# Patient Record
Sex: FEMALE | Race: BLACK OR AFRICAN AMERICAN | Employment: OTHER | RURAL
[De-identification: names, ages, dates, MRNs, and addresses within clinical notes are randomized per-mention and may not be internally consistent; named-entity substitution may affect disease eponyms.]

---

## 2017-01-01 ENCOUNTER — OFFICE VISIT (OUTPATIENT)
Dept: FAMILY MEDICINE CLINIC | Age: 67
End: 2017-01-01

## 2017-01-01 ENCOUNTER — HOSPITAL ENCOUNTER (OUTPATIENT)
Dept: MAMMOGRAPHY | Age: 67
Discharge: HOME | End: 2017-11-02
Payer: MEDICARE

## 2017-01-01 VITALS
OXYGEN SATURATION: 97 % | HEART RATE: 78 BPM | TEMPERATURE: 97.1 F | WEIGHT: 121 LBS | DIASTOLIC BLOOD PRESSURE: 75 MMHG | BODY MASS INDEX: 19.44 KG/M2 | RESPIRATION RATE: 16 BRPM | HEIGHT: 66 IN | SYSTOLIC BLOOD PRESSURE: 162 MMHG

## 2017-01-01 DIAGNOSIS — N18.5 CKD (CHRONIC KIDNEY DISEASE), STAGE 5: ICD-10-CM

## 2017-01-01 DIAGNOSIS — E78.5 HYPERLIPIDEMIA, UNSPECIFIED HYPERLIPIDEMIA TYPE: ICD-10-CM

## 2017-01-01 DIAGNOSIS — I10 ESSENTIAL HYPERTENSION: Primary | ICD-10-CM

## 2017-01-01 DIAGNOSIS — Z12.39 SCREENING BREAST EXAMINATION: ICD-10-CM

## 2017-01-01 DIAGNOSIS — E11.9 DIABETES MELLITUS TYPE 2, DIET-CONTROLLED (HCC): ICD-10-CM

## 2017-01-01 LAB
CHOLEST SERPL-MCNC: 163 MG/DL (ref 100–199)
EST. AVERAGE GLUCOSE BLD GHB EST-MCNC: 131 MG/DL
HBA1C MFR BLD: 6.2 % (ref 4.8–5.6)
HDLC SERPL-MCNC: 74 MG/DL
LDLC SERPL CALC-MCNC: 77 MG/DL (ref 0–99)
TRIGL SERPL-MCNC: 58 MG/DL (ref 0–149)
VLDLC SERPL CALC-MCNC: 12 MG/DL (ref 5–40)

## 2017-01-01 PROCEDURE — 77067 SCR MAMMO BI INCL CAD: CPT

## 2017-01-01 RX ORDER — HYDRALAZINE HYDROCHLORIDE 25 MG/1
25 TABLET, FILM COATED ORAL 3 TIMES DAILY
COMMUNITY
Start: 2017-01-01

## 2017-01-01 RX ORDER — LISINOPRIL 40 MG/1
40 TABLET ORAL DAILY
COMMUNITY
Start: 2017-01-01

## 2017-01-01 RX ORDER — SEVELAMER CARBONATE 800 MG/1
1600 TABLET, FILM COATED ORAL
COMMUNITY

## 2017-02-08 ENCOUNTER — SURGERY (OUTPATIENT)
Age: 67
End: 2017-02-08

## 2017-02-08 ENCOUNTER — ANESTHESIA EVENT (OUTPATIENT)
Dept: ENDOSCOPY | Age: 67
End: 2017-02-08
Payer: MEDICARE

## 2017-02-08 ENCOUNTER — ANESTHESIA (OUTPATIENT)
Dept: ENDOSCOPY | Age: 67
End: 2017-02-08
Payer: MEDICARE

## 2017-02-08 ENCOUNTER — HOSPITAL ENCOUNTER (OUTPATIENT)
Age: 67
Setting detail: OUTPATIENT SURGERY
Discharge: HOME | End: 2017-02-08
Attending: INTERNAL MEDICINE | Admitting: INTERNAL MEDICINE
Payer: MEDICARE

## 2017-02-08 VITALS
BODY MASS INDEX: 18.8 KG/M2 | DIASTOLIC BLOOD PRESSURE: 78 MMHG | OXYGEN SATURATION: 100 % | TEMPERATURE: 97.5 F | SYSTOLIC BLOOD PRESSURE: 208 MMHG | HEART RATE: 68 BPM | RESPIRATION RATE: 13 BRPM | HEIGHT: 66 IN | WEIGHT: 117 LBS

## 2017-02-08 LAB
GLUCOSE BLD STRIP.AUTO-MCNC: 138 MG/DL (ref 65–100)
GLUCOSE BLD STRIP.AUTO-MCNC: 75 MG/DL (ref 65–100)
GLUCOSE BLD STRIP.AUTO-MCNC: 78 MG/DL (ref 65–100)
SERVICE CMNT-IMP: ABNORMAL
SERVICE CMNT-IMP: NORMAL
SERVICE CMNT-IMP: NORMAL

## 2017-02-08 PROCEDURE — 74011000250 HC RX REV CODE- 250: Performed by: ANESTHESIOLOGY

## 2017-02-08 PROCEDURE — 76040000019: Performed by: INTERNAL MEDICINE

## 2017-02-08 PROCEDURE — 77030020268 HC MISC GENERAL SUPPLY: Performed by: INTERNAL MEDICINE

## 2017-02-08 PROCEDURE — 74011250636 HC RX REV CODE- 250/636

## 2017-02-08 PROCEDURE — 88305 TISSUE EXAM BY PATHOLOGIST: CPT | Performed by: INTERNAL MEDICINE

## 2017-02-08 PROCEDURE — 82962 GLUCOSE BLOOD TEST: CPT

## 2017-02-08 PROCEDURE — 77030011640 HC PAD GRND REM COVD -A: Performed by: INTERNAL MEDICINE

## 2017-02-08 PROCEDURE — 77030013992 HC SNR POLYP ENDOSC BSC -B: Performed by: INTERNAL MEDICINE

## 2017-02-08 PROCEDURE — 74011250636 HC RX REV CODE- 250/636: Performed by: INTERNAL MEDICINE

## 2017-02-08 PROCEDURE — 76060000031 HC ANESTHESIA FIRST 0.5 HR: Performed by: INTERNAL MEDICINE

## 2017-02-08 PROCEDURE — 74011250637 HC RX REV CODE- 250/637: Performed by: INTERNAL MEDICINE

## 2017-02-08 RX ORDER — NALOXONE HYDROCHLORIDE 0.4 MG/ML
0.4 INJECTION, SOLUTION INTRAMUSCULAR; INTRAVENOUS; SUBCUTANEOUS
Status: DISCONTINUED | OUTPATIENT
Start: 2017-02-08 | End: 2017-02-08 | Stop reason: HOSPADM

## 2017-02-08 RX ORDER — PROPOFOL 10 MG/ML
INJECTION, EMULSION INTRAVENOUS
Status: DISCONTINUED | OUTPATIENT
Start: 2017-02-08 | End: 2017-02-08 | Stop reason: HOSPADM

## 2017-02-08 RX ORDER — DEXTROSE 50 % IN WATER (D50W) INTRAVENOUS SYRINGE
25 AS NEEDED
Status: DISCONTINUED | OUTPATIENT
Start: 2017-02-08 | End: 2017-02-08 | Stop reason: HOSPADM

## 2017-02-08 RX ORDER — ATROPINE SULFATE 0.1 MG/ML
0.5 INJECTION INTRAVENOUS
Status: DISCONTINUED | OUTPATIENT
Start: 2017-02-08 | End: 2017-02-08 | Stop reason: HOSPADM

## 2017-02-08 RX ORDER — MIDAZOLAM HYDROCHLORIDE 1 MG/ML
.25-5 INJECTION, SOLUTION INTRAMUSCULAR; INTRAVENOUS
Status: DISCONTINUED | OUTPATIENT
Start: 2017-02-08 | End: 2017-02-08 | Stop reason: HOSPADM

## 2017-02-08 RX ORDER — EPINEPHRINE 0.1 MG/ML
1 INJECTION INTRACARDIAC; INTRAVENOUS
Status: DISCONTINUED | OUTPATIENT
Start: 2017-02-08 | End: 2017-02-08 | Stop reason: HOSPADM

## 2017-02-08 RX ORDER — DEXTROMETHORPHAN/PSEUDOEPHED 2.5-7.5/.8
1.2 DROPS ORAL
Status: DISCONTINUED | OUTPATIENT
Start: 2017-02-08 | End: 2017-02-08 | Stop reason: HOSPADM

## 2017-02-08 RX ORDER — PROPOFOL 10 MG/ML
INJECTION, EMULSION INTRAVENOUS AS NEEDED
Status: DISCONTINUED | OUTPATIENT
Start: 2017-02-08 | End: 2017-02-08 | Stop reason: HOSPADM

## 2017-02-08 RX ORDER — SODIUM CHLORIDE 9 MG/ML
50 INJECTION, SOLUTION INTRAVENOUS CONTINUOUS
Status: DISCONTINUED | OUTPATIENT
Start: 2017-02-08 | End: 2017-02-08 | Stop reason: HOSPADM

## 2017-02-08 RX ORDER — FLUMAZENIL 0.1 MG/ML
0.2 INJECTION INTRAVENOUS
Status: DISCONTINUED | OUTPATIENT
Start: 2017-02-08 | End: 2017-02-08 | Stop reason: HOSPADM

## 2017-02-08 RX ADMIN — DEXTROSE MONOHYDRATE 12.5 G: 25 INJECTION, SOLUTION INTRAVENOUS at 13:22

## 2017-02-08 RX ADMIN — PROPOFOL 40 MG: 10 INJECTION, EMULSION INTRAVENOUS at 14:30

## 2017-02-08 RX ADMIN — SIMETHICONE 80 MG: 20 SUSPENSION/ DROPS ORAL at 14:40

## 2017-02-08 RX ADMIN — SODIUM CHLORIDE 50 ML/HR: 900 INJECTION, SOLUTION INTRAVENOUS at 12:56

## 2017-02-08 RX ADMIN — PROPOFOL 100 MCG/KG/MIN: 10 INJECTION, EMULSION INTRAVENOUS at 14:30

## 2017-02-08 NOTE — IP AVS SNAPSHOT
[address] [place] [address] 
[phone] Patient: Evin Deleon MRN: [id number] LAT:9/2/4327 You are allergic to the following Allergen Reactions Peanut Swelling Recent Documentation Height Weight Breastfeeding? BMI OB Status Smoking Status 1.676 m 53.1 kg No 18.88 kg/m2 Hysterectomy Never Smoker Emergency Contacts Name Discharge Info Relation Home Work Mobile Darian Gambino  Other Relative [6] RebekahMaxwell  Friend [5] [phone] Xochilt Gambino  Other Relative [6] [phone] About your hospitalization You were admitted on:  February 8, 2017 You last received care in the:  [facility] ENDOSCOPY You were discharged on:  February 8, 2017 Unit phone number:  [phone] Why you were hospitalized Your primary diagnosis was:  Not on File Providers Seen During Your Hospitalizations Provider Role Specialty Primary office phone Alejandra Mesa MD Attending Provider Gastroenterology [phone] Your Primary Care Physician (PCP) Primary Care Physician Office Phone Office Fax Pb Lizama [phone] Follow-up Information None Your Appointments Tuesday February 14, 2017  8:20 AM EST  
ROUTINE CARE with Jonathan Hutchinson MD  
[address] [place] [address] [phone] Current Discharge Medication List  
  
CONTINUE these medications which have NOT CHANGED Dose & Instructions Dispensing Information Comments Morning Noon Evening Bedtime  
 amLODIPine 10 mg tablet Commonly known as:  Frantz Foster Your next dose is: Today, Tomorrow Other:  _________ Dose:  10 mg Take 1 Tab by mouth daily. Quantity:  90 Tab Refills:  3  
     
   
   
   
  
 calcitRIOL 0.5 mcg capsule Commonly known as:  ROCALTROL Your next dose is: Today, Tomorrow Other:  _________ Dose:  2 mcg Take 4 Caps by mouth daily. Quantity:  60 Cap Refills:  0  
     
   
   
   
  
 calcium acetate 667 mg Cap Commonly known as:  PHOSLO Your next dose is: Today, Tomorrow Other:  _________ Dose:  2 Cap Take 2 Caps by mouth three (3) times daily (with meals). Quantity:  60 Cap Refills:  0  
     
   
   
   
  
 carvedilol 25 mg tablet Commonly known as:  [place] Pretty Your next dose is: Today, Tomorrow Other:  _________ Dose:  25 mg Take 1 Tab by mouth two (2) times daily (with meals). Quantity:  60 Tab Refills:  0  
     
   
   
   
  
 furosemide 40 mg tablet Commonly known as:  LASIX Your next dose is: Today, Tomorrow Other:  _________ Take one tablet daily Quantity:  30 Tab Refills:  30  
     
   
   
   
  
 LIPITOR 20 mg tablet Generic drug:  atorvastatin Your next dose is: Today, Tomorrow Other:  _________ TAKE 1 BY MOUTH DAILY Quantity:  30 Tab Refills:  5  
     
   
   
   
  
 lisinopril 5 mg tablet Commonly known as:  Jesse Chris Your next dose is: Today, Tomorrow Other:  _________ Dose:  5 mg Take 1 Tab by mouth daily. Quantity:  60 Tab Refills:  0  
     
   
   
   
  
 OTHER(NON-FORMULARY) Your next dose is: Today, Tomorrow Other:  _________ Dose:  1 Each  
1 Each by Does Not Apply route daily. Prothesis for BKA of R leg Quantity:  1 Each Refills:  0 PROCRIT 10,000 unit/mL injection Generic drug:  epoetin mark Your next dose is: Today, Tomorrow Other:  _________  
   
   
 by SubCUTAneous route once. Refills:  0  
     
   
   
   
  
 sodium bicarbonate 325 mg tablet Your next dose is: Today, Tomorrow Other:  _________ Dose:  325 mg Take 1 Tab by mouth four (4) times daily. Quantity:  120 Tab Refills:  6 Discharge Instructions [address] [facility] [address] [place], [address] [facility] 
[phone] Alexidaksha Lovejoy [id number] 1950 COLON DISCHARGE INSTRUCTIONS DISCOMFORT: 
Redness at IV site- apply warm compress to area; if redness or soreness persist- contact your physician There may be a slight amount of blood passed from the rectum Gaseous discomfort- walking, belching will help relieve any discomfort You may not operate a vehicle for 12 hours You may not  engage in an occupation involving machinery or appliances for rest of today You may not  drink alcoholic beverages for at least 12 hours Avoid making any critical decisions for at least 24 hour DIET: 
 High fiber diet.  however -  remember your colon is empty and a heavy meal will produce gas. Avoid these foods:  vegetables, fried / greasy foods, carbonated drinks for today ACTIVITY: It is recommended that you spend the remainder of the day resting -  avoid any strenuous activity. CALL M.D. ANY SIGN OF: Increasing pain, nausea, vomiting Abdominal distension (swelling) New increased bleeding (oral or rectal) Fever (chills) Pain in chest area Bloody discharge from nose or mouth Shortness of breath You may resume your medications Post procedure diagnosis:  diverticulosis 
cecum and ascending colon polyps Follow-up Instructions: 
 
If a specimen was collected, you will receive a letter with the result by mail within two  weeks. Depending on the result this letter will specify your follow up colonoscopy date. Please call us for any questions or concerns Geraldo Wolfe [id number] 1950 DISCHARGE SUMMARY from Nurse The following personal items collected during your admission are returned to you: Dental Appliance:   
Vision: Visual Aid: None Hearing Aid:   
Jewelry:   
Clothing:   
Other Valuables:   
Valuables sent to safe:    
 
 
  
High-Fiber Diet: Care Instructions Your Care Instructions A high-fiber diet may help you relieve constipation and feel less bloated. Your doctor and dietitian will help you make a high-fiber eating plan based on your personal needs. The plan will include the things you like to eat. It will also make sure that you get 30 grams of fiber a day. Before you make changes to the way you eat, be sure to talk with your doctor or dietitian. Follow-up care is a key part of your treatment and safety. Be sure to make and go to all appointments, and call your doctor if you are having problems. It's also a good idea to know your test results and keep a list of the medicines you take. How can you care for yourself at home? · You can increase how much fiber you get if you eat more of certain foods. These foods include: ¨ Whole-grain breads and cereals. ¨ Fruits, such as pears, apples, and peaches. Eat the skins, peels, and seeds, if you can. ¨ Vegetables, such as broccoli, cabbage, spinach, carrots, asparagus, and squash. ¨ Starchy vegetables. These include potatoes with skins, kidney beans, and lima beans. · Take a fiber supplement every day if your doctor recommends it. Examples are Benefiber, Citrucel, FiberCon, and Metamucil. Ask your doctor how much to take. · Drink plenty of fluids, enough so that your urine is light yellow or clear like water. If you have kidney, heart, or liver disease and have to limit fluids, talk with your doctor before you increase the amount of fluids you drink. · Get some exercise every day. Exercise helps stool move through the colon. It also helps prevent constipation. · Keep a food diary. Try to notice and write down what foods cause gas, pain, or other symptoms. Then you can avoid these foods. Where can you learn more? Go to http://george-junior.info/. Enter O383 in the search box to learn more about \"High-Fiber Diet: Care Instructions. \" Current as of: July 26, 2016 Content Version: 11.1 © 5381-5007 Workspace, Incorporated. Care instructions adapted under license by [employer] (which disclaims liability or warranty for this information). If you have questions about a medical condition or this instruction, always ask your healthcare professional. [address] any warranty or liability for your use of this information. Discharge Orders None Introducing [facility]! [employer] introduces Fliplingo patient portal. Now you can access parts of your medical record, email your doctor's office, and request medication refills online. 1. In your internet browser, go to [email or web address] [email or web address] 2. Click on the First Time User? Click Here link in the Sign In box. You will see the New Member Sign Up page. 3. Enter your Fliplingo Access Code exactly as it appears below. You will not need to use this code after youve completed the sign-up process. If you do not sign up before the expiration date, you must request a new code. · Fliplingo Access Code: [email or web address] Expires: 5/9/2017 12:00 PM 
 
4. Enter the last four digits of your Social Security Number (xxxx) and Date of Birth (mm/dd/yyyy) as indicated and click Submit. You will be taken to the next sign-up page. 5. Create a Fliplingo ID. This will be your Fliplingo login ID and cannot be changed, so think of one that is secure and easy to remember. 6. Create a Fliplingo password. You can change your password at any time. 7. Enter your Password Reset Question and Answer. This can be used at a later time if you forget your password. 8. Enter your e-mail address. You will receive e-mail notification when new information is available in [address] 9. Click Sign Up. You can now view and download portions of your medical record. 10. Click the Download Summary menu link to download a portable copy of your medical information. If you have questions, please visit the Frequently Asked Questions section of the InfoLogix website. Remember, InfoLogix is NOT to be used for urgent needs. For medical emergencies, dial 911. Now available from your iPhone and Android! General Information Please provide this summary of care documentation to your next provider. Patient Signature:  ____________________________________________________________ Date:  ____________________________________________________________  
  
Dat Delgado Provider Signature:  ____________________________________________________________ Date:  ____________________________________________________________

## 2017-02-08 NOTE — ROUTINE PROCESS
Shaquille Burnette  1950  [id number]    Situation:  Verbal report received from: Lorenzo Miguel RN  Procedure: Procedure(s):  COLONOSCOPY    Background:    Preoperative diagnosis: SCREENING  Postoperative diagnosis: diverticulosis  cecum and ascending colon polyps    :  Dr. Aspen Tomas  Assistant(s): Endoscopy Technician-1: Jessica Ruiz  Endoscopy RN-1: Nyla Sheets RN    Specimens:   ID Type Source Tests Collected by Time Destination   1 : cecum and ascending colon polyps Preservative   Perlita Price MD 2/8/2017 1441 Pathology     H. Pylori  no    Assessment:  Intra-procedure medications     Anesthesia gave intra-procedure sedation and medications, see anesthesia flow sheet yes    Intravenous fluids: NS@ KVO     Vital signs stable     Abdominal assessment: round and soft     Recommendation:  Discharge patient per MD order. Family or Friend   Permission to share finding with family or friend yes. Endoscopy discharge instructions have been reviewed and given to patient and friend. The patient and friend verbalized understanding and acceptance of instructions.

## 2017-02-08 NOTE — DISCHARGE INSTRUCTIONS
[address]  [facility] , [address] [place]  [phone]                   Richelle Maynro  [id number]  1950    COLON DISCHARGE INSTRUCTIONS    DISCOMFORT:  Redness at IV site- apply warm compress to area; if redness or soreness persist- contact your physician  There may be a slight amount of blood passed from the rectum  Gaseous discomfort- walking, belching will help relieve any discomfort  You may not operate a vehicle for 12 hours  You may not  engage in an occupation involving machinery or appliances for rest of today  You may not  drink alcoholic beverages for at least 12 hours  Avoid making any critical decisions for at least 24 hour    DIET:   High fiber diet. - however -  remember your colon is empty and a heavy meal will produce gas. Avoid these foods:  vegetables, fried / greasy foods, carbonated drinks for today     ACTIVITY:  It is recommended that you spend the remainder of the day resting -  avoid any strenuous activity. CALL M.D. ANY SIGN OF:   Increasing pain, nausea, vomiting  Abdominal distension (swelling)  New increased bleeding (oral or rectal)  Fever (chills)  Pain in chest area  Bloody discharge from nose or mouth  Shortness of breath    You may resume your medications    Post procedure diagnosis:  diverticulosis  cecum and ascending colon polyps    Follow-up Instructions:    If a specimen was collected, you will receive a letter with the result by mail within two  weeks. Depending on the result this letter will specify your follow up colonoscopy date.       Please call us for any questions or concerns                     Richelle Maynor  [id number]  1950        DISCHARGE SUMMARY from Nurse    The following personal items collected during your admission are returned to you:   Dental Appliance:    Vision: Visual Aid: None  Hearing Aid:    Jewelry:    Clothing:    Other Valuables:    Valuables sent to safe:            High-Fiber Diet: Care Instructions  Your Care Instructions  A high-fiber diet may help you relieve constipation and feel less bloated. Your doctor and dietitian will help you make a high-fiber eating plan based on your personal needs. The plan will include the things you like to eat. It will also make sure that you get 30 grams of fiber a day. Before you make changes to the way you eat, be sure to talk with your doctor or dietitian. Follow-up care is a key part of your treatment and safety. Be sure to make and go to all appointments, and call your doctor if you are having problems. It's also a good idea to know your test results and keep a list of the medicines you take. How can you care for yourself at home? · You can increase how much fiber you get if you eat more of certain foods. These foods include:  ¨ Whole-grain breads and cereals. ¨ Fruits, such as pears, apples, and peaches. Eat the skins, peels, and seeds, if you can. ¨ Vegetables, such as broccoli, cabbage, spinach, carrots, asparagus, and squash. ¨ Starchy vegetables. These include potatoes with skins, kidney beans, and lima beans. · Take a fiber supplement every day if your doctor recommends it. Examples are Benefiber, Citrucel, FiberCon, and Metamucil. Ask your doctor how much to take. · Drink plenty of fluids, enough so that your urine is light yellow or clear like water. If you have kidney, heart, or liver disease and have to limit fluids, talk with your doctor before you increase the amount of fluids you drink. · Get some exercise every day. Exercise helps stool move through the colon. It also helps prevent constipation. · Keep a food diary. Try to notice and write down what foods cause gas, pain, or other symptoms. Then you can avoid these foods. Where can you learn more? Go to http://george-junior.info/. Enter Z324 in the search box to learn more about \"High-Fiber Diet: Care Instructions. \"  Current as of: July 26, 2016  Content Version: 11.1  © 9339-4108 Seek & Adore, Incorporated. Care instructions adapted under license by [employer] ([employer]) (which disclaims liability or warranty for this information). If you have questions about a medical condition or this instruction, always ask your healthcare professional. [address] any warranty or liability for your use of this information.

## 2017-02-08 NOTE — ANESTHESIA PREPROCEDURE EVALUATION
Anesthetic History   No history of anesthetic complications            Review of Systems / Medical History  Patient summary reviewed, nursing notes reviewed and pertinent labs reviewed    Pulmonary  Within defined limits                 Neuro/Psych       CVA       Cardiovascular    Hypertension      CHF        Exercise tolerance: >4 METS  Comments: carvedilol (COREG) 25 mg tablet   2/7/2017 at Unknown time    Will use IV PRN    2015 echo  SUMMARY:  Left ventricle: No obvious wall motion abnormalities identified in the  views obtained. Wall thickness was increased. Features were consistent  with a pseudonormal left ventricular filling pattern, with concomitant  abnormal relaxation and increased filling pressure (grade 2 diastolic  dysfunction).      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

## 2017-02-08 NOTE — PERIOP NOTES
BP elevated. States she is due to take her BP med soon, and will resume her medication schedule as ordered.

## 2017-02-08 NOTE — PROCEDURES
[address]  [address] [facility], [address] [facility]  [phone]               Colonoscopy Operative Report      Indications:    Screening colonoscopy     :  Aidee Carrero MD    Referring Provider: Angel Escalante MD    Sedation:  MAC anesthesia Propofol    Procedure Details:  After informed consent was obtained with all risks and benefits of procedure explained and preoperative exam completed, the patient was taken to the endoscopy suite and placed in the left lateral decubitus position. Upon sequential sedation as per above, a digital rectal exam was performed  And was normal.  The Olympus videocolonoscope  was inserted in the rectum and carefully advanced to the cecum, which was identified by the ileocecal valve and appendiceal orifice, terminal ileum. The quality of preparation was poor in right colon and cecum. The colonoscope was slowly withdrawn with careful evaluation between folds. Retroflexion in the rectum was performed and was normal.    Findings:   Rectum: Grade 1 internal hemorrhoid(s); Sigmoid:     - Diverticulosis  Descending Colon:     - Diverticulosis  Transverse Colon: 2  Sessile polyp(s), the largest 6 mm in size;  Ascending Colon: 4  Sessile polyp(s), the largest 10 mm in size;  Cecum: 2  Sessile polyp(s), the largest 9 mm in size; Terminal Ileum: normal    Interventions:  8 complete polypectomy were performed using hot snare   ( 18 W power current) on largest polyps and cold snare on smaller ones. All polyps were  retrieved  endoclip was placed for hemorrhage prophylaxis x2    Specimen Removed:  As above. A total of 8 sessile colonic polyps    Complications: None. EBL:  None. Recommendations:   -Await pathology.  -High fiber diet. -Repeat colonoscopy in 1 year  With 2 day prep ( call office to schedule)    Resume normal medication(s). Discharge Disposition:  Home in the company of a  when able to ambulate.     Rodriguez Espinal Pj Watters MD  2/8/2017  3:00 PM

## 2017-02-08 NOTE — H&P
[address]  [address] [facility], [address] [facility]  [phone]                 History and Physical     NAME: Billy French   :  1950   MRN:  [id number]     HPI:   Pt referred by PCP for screening colonoscopy. The patient is a 78year old female who presents for a screening colonscopy. The patient presents for a screening colonoscopy evaluation [facility] There has been no associated change in bowel habits ( or use of blood thinners), hematochezia, weight loss, diarrhea or abdominal pain. The frequency of bowel movements has been The stools are of normal consistency. Previous diagnostic tests have included colonoscopy. Note for \"Screening Colonoscopy\":    Pt is a 77 yo woman w/ a hx of DM II (no meds), CVA () and presents  for screening colon evaluation due to being on dialysis (3 days per week M/W/) x3 years. [place]as colon was done in [place] and per pt believes she had a nl exam.  PSHx of hysterectomy.  Denies use of blood thinners, weight loss, diarrhea, dysphagia, heartburn, abd pain, indigestion, bloody stools, peritoineal dialysis.  P reports being on waiting list for renal tx.     Past Surgical History   Procedure Laterality Date    Hx gracie and bso      Hx heent       cataract    Hx other surgical       AV FISTULA LEFT ARM     Past Medical History   Diagnosis Date    Anemia     CHF (congestive heart failure) (HCC)     Chronic kidney disease     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 ([facility] [address]     CVA (cerebral infarction)      [facility] [address] [facility])     HTN (hypertension)     Hyperlipidemia     Hypoglycemia      [facility]    Renal failure     Stroke [facility])      Social History   Substance Use Topics    Smoking status: Never Smoker    Smokeless tobacco: Never Used    Alcohol use No      Comment: no caffeine, lives with sister, enjoys reading Allergies   Allergen Reactions    Peanut Swelling     History reviewed. No pertinent family history. Current Facility-Administered Medications   Medication Dose Route Frequency    0.9% sodium chloride infusion  50 mL/hr IntraVENous CONTINUOUS    midazolam (VERSED) injection 0.25-5 mg  0.25-5 mg IntraVENous Multiple    naloxone (NARCAN) injection 0.4 mg  0.4 mg IntraVENous Multiple    flumazenil (ROMAZICON) 0.1 mg/mL injection 0.2 mg  0.2 mg IntraVENous Multiple    simethicone (MYLICON) 79ZM/4.9YM oral drops 80 mg  1.2 mL Oral Multiple    atropine injection 0.5 mg  0.5 mg IntraVENous ONCE PRN    EPINEPHrine (ADRENALIN) 0.1 mg/mL syringe 1 mg  1 mg Endoscopically ONCE PRN    dextrose (D50W) injection syrg 12.5 g  25 mL IntraVENous PRN         PHYSICAL EXAM:  General: WD, WN. Alert, cooperative, no acute distress    HEENT: NC, Atraumatic. PERRLA, EOMI. Anicteric sclerae. Lungs:  CTA Bilaterally. No Wheezing/Rhonchi/Rales. Heart:  Regular  rhythm,  No murmur, No Rubs, No Gallops  Abdomen: Soft, Non distended, Non tender.  +Bowel sounds, no HSM  Extremities: No c/c/e  Neurologic:  CN 2-12 gi, Alert and oriented X 3. No acute neurological distress   Psych:   Good insight. Not anxious nor agitated. Assessment:   I have reviewed with the patient +/- family alternatives,benefits and risks for the procedure, as well as potential complications(with emphasis on, but not limited to, bleeding, perforation, cardiovascular/cerebrovascular/pulmonary events, reactions to the medications, infection, risk of missing a lesions/a cancer, and the imponderables including death), alternative options, and patient/family voices understanding.       Plan:   · Endoscopic procedure  · Conscious sedation or MAC

## 2017-02-08 NOTE — ANESTHESIA POSTPROCEDURE EVALUATION
Post-Anesthesia Evaluation and Assessment    Patient: Sara Calle MRN: [id number]  SSN: [id number]    YOB: 1950  Age: 78 y.o. Sex: female       Cardiovascular Function/Vital Signs  Visit Vitals    /87    Pulse 73    Temp 36.8 °C (98.3 °F)    Resp 12    Ht 5' 6\" (1.676 m)    Wt 53.1 kg (117 lb)    SpO2 100%    Breastfeeding No    BMI 18.88 kg/m2       Patient is status post MAC anesthesia for Procedure(s):  COLONOSCOPY  ENDOSCOPIC POLYPECTOMY  RESOLUTION CLIP. Nausea/Vomiting: None    Postoperative hydration reviewed and adequate. Pain:  Pain Scale 1: Numeric (0 - 10) (02/08/17 1247)  Pain Intensity 1: 0 (02/08/17 1247)   Managed    Neurological Status: At baseline    Mental Status and Level of Consciousness: Arousable    Pulmonary Status:   O2 Device: Nasal cannula (02/08/17 1455)   Adequate oxygenation and airway patent    Complications related to anesthesia: None    Post-anesthesia assessment completed.  No concerns    Signed By: Hazmah Guthrie MD     February 8, 2017

## 2017-02-08 NOTE — PERIOP NOTES
ABD remains soft and non-tender post procedure. Pt has no complaints at this time and tolerated the procedure well. Received report from anesthesia, see anesthesia record.

## 2017-02-14 ENCOUNTER — TELEPHONE (OUTPATIENT)
Dept: FAMILY MEDICINE CLINIC | Age: 67
End: 2017-02-14

## 2017-02-14 ENCOUNTER — OFFICE VISIT (OUTPATIENT)
Dept: FAMILY MEDICINE CLINIC | Age: 67
End: 2017-02-14

## 2017-02-14 VITALS
SYSTOLIC BLOOD PRESSURE: 179 MMHG | RESPIRATION RATE: 16 BRPM | TEMPERATURE: 97.8 F | OXYGEN SATURATION: 95 % | BODY MASS INDEX: 19.93 KG/M2 | DIASTOLIC BLOOD PRESSURE: 59 MMHG | WEIGHT: 124 LBS | HEART RATE: 87 BPM | HEIGHT: 66 IN

## 2017-02-14 DIAGNOSIS — S88.911A AMPUTATED RIGHT LEG (HCC): ICD-10-CM

## 2017-02-14 DIAGNOSIS — Z13.39 SCREENING FOR ALCOHOLISM: ICD-10-CM

## 2017-02-14 DIAGNOSIS — N18.6 ESRD ON DIALYSIS (HCC): ICD-10-CM

## 2017-02-14 DIAGNOSIS — Z00.00 ROUTINE GENERAL MEDICAL EXAMINATION AT A HEALTH CARE FACILITY: ICD-10-CM

## 2017-02-14 DIAGNOSIS — E11.9 DIABETES MELLITUS TYPE 2, DIET-CONTROLLED (HCC): ICD-10-CM

## 2017-02-14 DIAGNOSIS — Z13.31 SCREENING FOR DEPRESSION: ICD-10-CM

## 2017-02-14 DIAGNOSIS — I10 ESSENTIAL HYPERTENSION: Primary | ICD-10-CM

## 2017-02-14 DIAGNOSIS — Z99.2 ESRD ON DIALYSIS (HCC): ICD-10-CM

## 2017-02-14 NOTE — TELEPHONE ENCOUNTER
Patient needs a prescription written for:  Eval and Treat for BKA prosthetic leg    Faxed to [facility] [phone]

## 2017-02-14 NOTE — TELEPHONE ENCOUNTER
Patient called and requested that we contact   [facility] at [phone] about her request.  Patient was seen today.

## 2017-02-14 NOTE — PROGRESS NOTES
Reviewed record in preparation for visit and have necessary documentation  Pt did not bring medication to office visit for review  Opportunity was given for questions  Goals that were addressed and/or need to be completed after this appointment include   Health Maintenance Due   Topic Date Due    DTaP/Tdap/Td series (1 - Tdap) 05/03/1970    ZOSTER VACCINE AGE 60>  05/03/2009    OSTEOPOROSIS SCREENING (DEXA)  05/03/2014    INFLUENZA AGE 9 TO ADULT  08/01/2016    EYE EXAM RETINAL OR DILATED Q1  09/01/2016    MEDICARE YEARLY EXAM  11/17/2016     Will request eye exam.

## 2017-02-22 NOTE — PROGRESS NOTES
Chief Complaint   Patient presents with    Hypertension    Annual Wellness Visit     she is a 78y.o. year old female who presents for follow up of her HTN. BP uncontrolled at last OV. Medical history is significant for ESRD, HTN, HLD, DM2, anemia, GERD and vitamin D deficiency. BP monitored by nephrology at dialysis clinic. Patient denies HA, dizziness, SOB, CP, abdominal pain, dysuria, acute myalgias or arthralgias. She says she is in need of a new prosthetic leg. Hypertension:  The patient reports that she is taking medications as instructed, no medication side effects noted, no TIA's, no chest pain on exertion, no dyspnea on exertion, no swelling of ankles. Lifestyle modification/social history: sedentary     BP Readings from Last 3 Encounters:   02/14/17 179/59   02/08/17 (!) 208/78   11/03/16 160/66     Patient advised to log blood pressures at home 2-3 times weekly and bring to next visit. Call office as soon as possible if BP's over 140/90 on multiple occasions or with symptoms of dizziness, chest pain, shortness of breath, headache or ankle swelling. Our goal is to normalize the blood pressure to decrease the risks of strokes and heart attacks. The patient is in agreement with the plan.           Patient Active Problem List   Diagnosis Code    CHF (congestive heart failure) (HCC) I50.9    CVA (cerebral infarction) I63.9    Hyperlipidemia E78.5    HTN (hypertension) I10    Anemia, chronic disease D63.8    GERD (gastroesophageal reflux disease) K21.9    Vitamin D deficiency E55.9    Nausea R11.0    Constipation K59.00    Renal failure N19    Hyperkalemia E87.5    Renal failure (ARF), acute on chronic (HCC) N17.9, W62.5    Metabolic encephalopathy B58.83    Diastolic dysfunction W17.6    LVH (left ventricular hypertrophy) I51.7    UTI (lower urinary tract infection) N39.0    Type II diabetes mellitus with complication ([facility]) K94.6    Amputated right leg ([facility] [address] Z89.611     Past Surgical History:   Procedure Laterality Date    COLONOSCOPY  2/8/2017         COLONOSCOPY N/A 2/8/2017    COLONOSCOPY performed by Radha Aguilar MD at [address] [facility] HX HEENT      cataract    HX OTHER SURGICAL      AV FISTULA LEFT ARM    HX REJI AND BSO       Social History     Social History    Marital status: SINGLE     Spouse name: N/A    Number of children: N/A    Years of education: N/A     Occupational History    Not on file. Social History Main Topics    Smoking status: Never Smoker    Smokeless tobacco: Never Used    Alcohol use No      Comment: no caffeine, lives with sister, enjoys reading   [facility] Drug use: No    Sexual activity: Not on file     Other Topics Concern    Not on file     Social History Narrative     History reviewed. No pertinent family history. Current Outpatient Prescriptions   Medication Sig    LIPITOR 20 mg tablet TAKE 1 BY MOUTH DAILY    epoetin mark (PROCRIT) 10,000 unit/mL injection by SubCUTAneous route once.  furosemide (LASIX) 40 mg tablet Take one tablet daily    amLODIPine (NORVASC) 10 mg tablet Take 1 Tab by mouth daily.  lisinopril (PRINIVIL, ZESTRIL) 5 mg tablet Take 1 Tab by mouth daily.  carvedilol (COREG) 25 mg tablet Take 1 Tab by mouth two (2) times daily (with meals).  calcitRIOL (ROCALTROL) 0.5 mcg capsule Take 4 Caps by mouth daily.  calcium acetate (PHOSLO) 667 mg cap Take 2 Caps by mouth three (3) times daily (with meals). Yumiko Green, 1 Each by Does Not Apply route daily. Prothesis for BKA of R leg    sodium bicarbonate 325 mg tablet Take 1 Tab by mouth four (4) times daily. No current facility-administered medications for this visit.       Allergies   Allergen Reactions    Peanut Swelling       Review of Symptoms:  Constitutional: Negative for fever, chills, fatigue, malaise  Skin: Negative for rash or lesion  HEENT: Negative for acute hearing or vision changes  Resp: Negative for cough, wheezing or SOB  Cardiovascular: Negative for chest pain, dizziness or palpitations  Abdomen: Negative for nausea or abdominal pain  Genital/Urinary: ESRD on dialysis  Musculoskeltal: h/o R BKA, Negative for acute myalgias or arthralgias   Neurological: Negative for headache, weakness or paresthesia  Psychological: Negative for depression or anxiety     Vitals:    02/14/17 0815 02/14/17 0819   BP: 182/79 179/59   Pulse: 88 87   Resp: 16    Temp: 97.8 °F (36.6 °C)    TempSrc: Oral    SpO2: 95%    Weight: 124 lb (56.2 kg)    Height: 5' 6\" (1.676 m)        Physical Exam:  General: Cooperative, no distress, appears stated age. Skin: Skin color, texture, turgor normal.  Head: Normocephalic, atraumatic. Eyes: Conjunctivae clear. PERRL, EOMs intact. Neck: FROM, no adenopathy  Lungs: Clear to auscultation bilaterally with symmetrical effort. Heart: Regular rate and rhythm, S1, S2 normal, II/VI VAUGHN  Abdomen: Soft, non-tender. Non-distended. Bowel sounds normal.   Extremities: right BKA, LLE with no edema  Left Foot: decreased sensation, no lesion    Neurologic:  normal strength and sensation  Psychological: Alert and oriented. Affect appropriate    Neva Barajas was seen today for hypertension and annual wellness visit. Diagnoses and all orders for this visit:    Essential hypertension  -     REFERRAL TO OPHTHALMOLOGY    ESRD on dialysis ([facility] [address]    Diabetes mellitus type 2, diet-controlled ([facility] [address]  -     REFERRAL TO OPHTHALMOLOGY    Amputated right leg ([facility] [address]    Order for prosthetic leg evaluation and treatment made. I have discussed the diagnosis with the patient and the intended plan as seen in the above orders. Importance of compliance with all prescribed medications discussed. The patient expresses understanding and agreement with our plan of care. All of the patient's questions were answered to apparent satisfaction. The patient has received an after-visit summary.  The patient knows to call our office if there are any questions or concerns regarding diagnosis and treatment plans. I have discussed medication side effects and warnings with the patient as well. Follow-up Disposition:  Return in about 3 months (around 5/14/2017), or if symptoms worsen or fail to improve. The following Annual Medicare Wellness Exam is distinct and separate from the medical evaluation and decision making. This is a Subsequent Medicare Annual Wellness Visit providing Personalized Prevention Plan Services (PPPS) (Performed 12 months after initial AWV and PPPS )    I have reviewed the patient's medical history in detail and updated the computerized patient record. History     Past Medical History:   Diagnosis Date    Anemia     CHF (congestive heart failure) (HCC)     Chronic kidney disease     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 (HCC)     CVA (cerebral infarction)     September 2010 [facility] [address] [facility])     HTN (hypertension)     Hyperlipidemia     Hypoglycemia     [facility]    Renal failure     Stroke [facility])       Past Surgical History:   Procedure Laterality Date    COLONOSCOPY  2/8/2017         COLONOSCOPY N/A 2/8/2017    COLONOSCOPY performed by Devora Foster MD at [address] [facility] HX HEENT      cataract    HX OTHER SURGICAL      AV FISTULA LEFT ARM    HX REJI AND BSO       Current Outpatient Prescriptions   Medication Sig Dispense Refill    LIPITOR 20 mg tablet TAKE 1 BY MOUTH DAILY 30 Tab 5    epoetin mark (PROCRIT) 10,000 unit/mL injection by SubCUTAneous route once.  furosemide (LASIX) 40 mg tablet Take one tablet daily 30 Tab 30    amLODIPine (NORVASC) 10 mg tablet Take 1 Tab by mouth daily. 90 Tab 3    lisinopril (PRINIVIL, ZESTRIL) 5 mg tablet Take 1 Tab by mouth daily. 60 Tab 0    carvedilol (COREG) 25 mg tablet Take 1 Tab by mouth two (2) times daily (with meals). 60 Tab 0    calcitRIOL (ROCALTROL) 0.5 mcg capsule Take 4 Caps by mouth daily.  60 Cap 0    calcium acetate (PHOSLO) 667 mg cap Take 2 Caps by mouth three (3) times daily (with meals). [address], 1 Each by Does Not Apply route daily. Prothesis for BKA of R leg 1 Each 0    sodium bicarbonate 325 mg tablet Take 1 Tab by mouth four (4) times daily. 120 Tab 6     Allergies   Allergen Reactions    Peanut Swelling     History reviewed. No pertinent family history. Social History   Substance Use Topics    Smoking status: Never Smoker    Smokeless tobacco: Never Used    Alcohol use No      Comment: no caffeine, lives with sister, enjoys reading     Patient Active Problem List   Diagnosis Code    CHF (congestive heart failure) ([facility]) I50.9    CVA (cerebral infarction) I63.9    Hyperlipidemia E78.5    HTN (hypertension) I10    Anemia, chronic disease D63.8    GERD (gastroesophageal reflux disease) K21.9    Vitamin D deficiency E55.9    Nausea R11.0    Constipation K59.00    Renal failure N19    Hyperkalemia E87.5    Renal failure (ARF), acute on chronic ([facility]) N17.9, J14.7    Metabolic encephalopathy U66.16    Diastolic dysfunction M21.4    LVH (left ventricular hypertrophy) I51.7    UTI (lower urinary tract infection) N39.0    Type II diabetes mellitus with complication ([facility]) E16.4    Amputated right leg ([facility] [address] Z89.611       Depression Risk Factor Screening:     PHQ 2 / 9, over the last two weeks 6/30/2016   Little interest or pleasure in doing things Not at all   Feeling down, depressed or hopeless Not at all   Total Score PHQ 2 0     Alcohol Risk Factor Screening: On any occasion during the past 3 months, have you had more than 3 drinks containing alcohol? No    Do you average more than 7 drinks per week? No      Functional Ability and Level of Safety:     Hearing Loss   mild    Activities of Daily Living   Self-care. Requires assistance with: no ADLs    Fall Risk     Fall Risk Assessment, last 12 mths 6/30/2016   Able to walk? Yes   Fall in past 12 months?  No Abuse Screen   Patient is not abused    Evaluation of Cognitive Function:  Mood/affect:  neutral  Appearance: age appropriate and casually dressed  Family member/caregiver input: none      Patient Care Team:  Luisa Ireland MD as PCP - General (Family Practice)  Todd Hussein MD (Cardiology)  Gunjan Fontana III, OD (Optometry)    Advice/Referrals/Counseling   Education and counseling provided:  Are appropriate based on today's review and evaluation  End-of-Life planning (with patient's consent)    Assessment/Plan       ICD-10-CM ICD-9-CM    1. Routine general medical examination at a health care facility Z00.00 V70.0    2. Screening for alcoholism Z13.89 V79.1 LA ANNUAL ALCOHOL SCREEN 15 MIN   3. Screening for depression Z13.89 V79.0    .

## 2017-07-18 NOTE — PROGRESS NOTES
Reviewed record in preparation for visit and have necessary documentation  Pt did bring medication to office visit for review  opportunity was given for questions  Goals that were addressed and/or need to be completed during or after this appointment include    Health Maintenance Due   Topic Date Due    DTaP/Tdap/Td series (1 - Tdap) 05/03/1970    ZOSTER VACCINE AGE 60>  05/03/2009    OSTEOPOROSIS SCREENING (DEXA)  05/03/2014    HEMOGLOBIN A1C Q6M  05/03/2017

## 2017-07-18 NOTE — MR AVS SNAPSHOT
Visit Information Date & Time Provider Department Dept. Phone Encounter #  
 7/18/2017  9:20 AM Suma Mijares MD [facility] [place] [id number] Follow-up Instructions Return in about 6 months (around 1/18/2018), or if symptoms worsen or fail to improve. Upcoming Health Maintenance Date Due DTaP/Tdap/Td series (1 - Tdap) 5/3/1970 ZOSTER VACCINE AGE 60> 5/3/2009 OSTEOPOROSIS SCREENING (DEXA) 5/3/2014 HEMOGLOBIN A1C Q6M 5/3/2017 Pneumococcal 65+ High/Highest Risk (2 of 2 - PCV13) 11/7/2017* INFLUENZA AGE 9 TO ADULT 8/1/2017 LIPID PANEL Q1 11/3/2017 FOOT EXAM Q1 11/7/2017 MICROALBUMIN Q1 11/7/2017 MEDICARE YEARLY EXAM 2/15/2018 EYE EXAM RETINAL OR DILATED Q1 4/21/2018 BREAST CANCER SCRN MAMMOGRAM 10/20/2018 GLAUCOMA SCREENING Q2Y 4/21/2019 *Topic was postponed. The date shown is not the original due date. Allergies as of 7/18/2017  Review Complete On: 7/18/2017 By: Suma Mijares MD  
  
 Severity Noted Reaction Type Reactions Peanut Medium 02/25/2015    Swelling Current Immunizations  Never Reviewed Name Date Pneumococcal Polysaccharide (PPSV-23) 4/1/2015 Not reviewed this visit You Were Diagnosed With   
  
 Codes Comments Essential hypertension    -  Primary ICD-10-CM: I10 
ICD-9-CM: 401.9 Hyperlipidemia, unspecified hyperlipidemia type     ICD-10-CM: E78.5 ICD-9-CM: 272.4 Diabetes mellitus type 2, [facility]     ICD-10-CM: E11.9 ICD-9-CM: 250.00 CKD (chronic kidney disease), stage 5 (HCC)     ICD-10-CM: N18.5 ICD-9-CM: 561. 5 Vitals BP Pulse Temp Resp Height(growth percentile) Weight(growth percentile) 162/75 (BP 1 Location: Right arm, BP Patient Position: Sitting) 78 97.1 °F (36.2 °C) (Oral) 16 5' 6\" (1.676 m) 121 lb (54.9 kg) SpO2 BMI OB Status Smoking Status 97% 19.53 kg/m2 Hysterectomy Never Smoker Vitals History BMI and BSA Data Body Mass Index Body Surface Area  
 19.53 kg/m 2 1.6 m 2 Preferred Pharmacy Pharmacy Name Phone [address], [address] [facility] [address] [facility] Your Updated Medication List  
  
   
This list is accurate as of: 7/18/17 10:27 AM.  Always use your most recent med list. amLODIPine 10 mg tablet Commonly known as:  Wiley Emerald Take 1 Tab by mouth daily. carvedilol 25 mg tablet Commonly known as:  Pink Parrot Take 1 Tab by mouth two (2) times daily (with meals). hydrALAZINE 25 mg tablet Commonly known as:  APRESOLINE Take 25 mg by mouth three (3) times daily. LIPITOR 20 mg tablet Generic drug:  atorvastatin TAKE 1 BY MOUTH DAILY  
  
 lisinopril 40 mg tablet Commonly known as:  Les Juan Take 40 mg by mouth daily. OTHER(NON-FORMULARY) 1 Each by Does Not Apply route daily. Prothesis for BKA of R leg PROCRIT 10,000 unit/mL injection Generic drug:  epoetin mark  
by SubCUTAneous route once. RENVELA 800 mg Tab tab Generic drug:  sevelamer carbonate Take 1,600 mg by mouth three (3) times daily (with meals). We Performed the Following HEMOGLOBIN A1C WITH EAG [18987 CPT(R)] LIPID PANEL [04230 CPT(R)] Follow-up Instructions Return in about 6 months (around 1/18/2018), or if symptoms worsen or fail to improve. Patient Instructions DASH Diet: Care Instructions Your Care Instructions The DASH diet is an eating plan that can help lower your blood pressure. DASH stands for Dietary Approaches to Stop Hypertension. Hypertension is high blood pressure. The DASH diet focuses on eating foods that are high in calcium, potassium, and magnesium. These nutrients can lower blood pressure. The foods that are highest in these nutrients are fruits, vegetables, low-fat dairy products, nuts, seeds, and legumes.  But taking calcium, potassium, and magnesium supplements instead of eating foods that are high in those nutrients does not have the same effect. The DASH diet also includes whole grains, fish, and poultry. The DASH diet is one of several lifestyle changes your doctor may recommend to lower your high blood pressure. Your doctor may also want you to decrease the amount of sodium in your diet. Lowering sodium while following the DASH diet can lower blood pressure even further than just the DASH diet alone. Follow-up care is a key part of your treatment and safety. Be sure to make and go to all appointments, and call your doctor if you are having problems. It's also a good idea to know your test results and keep a list of the medicines you take. How can you care for yourself at home? Following the DASH diet · Eat 4 to 5 servings of fruit each day. A serving is 1 medium-sized piece of fruit, ½ cup chopped or canned fruit, 1/4 cup dried fruit, or 4 ounces (½ cup) of fruit juice. Choose fruit more often than fruit juice. · Eat 4 to 5 servings of vegetables each day. A serving is 1 cup of lettuce or raw leafy vegetables, ½ cup of chopped or cooked vegetables, or 4 ounces (½ cup) of vegetable juice. Choose vegetables more often than vegetable juice. · Get 2 to 3 servings of low-fat and fat-free dairy each day. A serving is 8 ounces of milk, 1 cup of yogurt, or 1 ½ ounces of cheese. · Eat 6 to 8 servings of grains each day. A serving is 1 slice of bread, 1 ounce of dry cereal, or ½ cup of cooked rice, pasta, or cooked cereal. Try to choose whole-grain products as much as possible. · Limit lean meat, poultry, and fish to 2 servings each day. A serving is 3 ounces, about the size of a deck of cards. · Eat 4 to 5 servings of nuts, seeds, and legumes (cooked dried beans, lentils, and split peas) each week. A serving is 1/3 cup of nuts, 2 tablespoons of seeds, or ½ cup of cooked beans or peas. · Limit fats and oils to 2 to 3 servings each day. A serving is 1 teaspoon of vegetable oil or 2 tablespoons of salad dressing. · Limit sweets and added sugars to 5 servings or less a week. A serving is 1 tablespoon jelly or jam, ½ cup sorbet, or 1 cup of lemonade. · Eat less than 2,300 milligrams (mg) of sodium a day. If you limit your sodium to 1,500 mg a day, you can lower your blood pressure even more. Tips for success · Start small. Do not try to make dramatic changes to your diet all at once. You might feel that you are missing out on your favorite foods and then be more likely to not follow the plan. Make small changes, and stick with them. Once those changes become habit, add a few more changes. · Try some of the following: ¨ Make it a goal to eat a fruit or vegetable at every meal and at snacks. This will make it easy to get the recommended amount of fruits and vegetables each day. ¨ Try yogurt topped with fruit and nuts for a snack or healthy dessert. ¨ Add lettuce, tomato, cucumber, and onion to sandwiches. ¨ Combine a ready-made pizza crust with low-fat mozzarella cheese and lots of vegetable toppings. Try using tomatoes, squash, spinach, broccoli, carrots, cauliflower, and onions. ¨ Have a variety of cut-up vegetables with a low-fat dip as an appetizer instead of chips and dip. ¨ Sprinkle sunflower seeds or chopped almonds over salads. Or try adding chopped walnuts or almonds to cooked vegetables. ¨ Try some vegetarian meals using beans and peas. Add garbanzo or kidney beans to salads. Make burritos and tacos with mashed marte beans or black beans. Where can you learn more? Go to http://george-junior.info/. Enter Q386 in the search box to learn more about \"DASH Diet: Care Instructions. \" Current as of: April 3, 2017 Content Version: 11.3 © 5284-5848 [employer], [employer].  Care instructions adapted under license by [address] (which disclaims liability or warranty for this information). If you have questions about a medical condition or this instruction, always ask your healthcare professional. [address] any warranty or liability for your use of this information. Introducing hospitals & HEALTH SERVICES! Mónica Augustine introduces [employer] patient portal. Now you can access parts of your medical record, email your doctor's office, and request medication refills online. 1. In your internet browser, go to [email or web address] 2. Click on the First Time User? Click Here link in the Sign In box. You will see the New Member Sign Up page. 3. Enter your [employer] Access Code exactly as it appears below. You will not need to use this code after youve completed the sign-up process. If you do not sign up before the expiration date, you must request a new code. · [employer] Access Code: [email or web address] Expires: 10/16/2017  9:21 AM 
 
4. Enter the last four digits of your Social Security Number (xxxx) and Date of Birth (mm/dd/yyyy) as indicated and click Submit. You will be taken to the next sign-up page. 5. Create a [employer] ID. This will be your [employer] login ID and cannot be changed, so think of one that is secure and easy to remember. 6. Create a [employer] password. You can change your password at any time. 7. Enter your Password Reset Question and Answer. This can be used at a later time if you forget your password. 8. Enter your e-mail address. You will receive e-mail notification when new information is available in [address] 9. Click Sign Up. You can now view and download portions of your medical record. 10. Click the Download Summary menu link to download a portable copy of your medical information. If you have questions, please visit the Frequently Asked Questions section of the [employer] website.  Remember, [employer] is NOT to be used for urgent needs. For medical emergencies, dial 911. Now available from your iPhone and Android! Please provide this summary of care documentation to your next provider. Your primary care clinician is listed as Alveda Blocker. If you have any questions after today's visit, please call [phone].

## 2017-07-18 NOTE — PATIENT INSTRUCTIONS

## 2017-07-26 NOTE — PROGRESS NOTES
Chief Complaint   Patient presents with    Hypertension     she is a 75y.o. year old female who presents for follow up of chronic medical conditions. Medical history is significant for ESRD, HTN, HLD, DM2, anemia, GERD and vitamin D deficiency. HTN uncontrolled. BP monitored by nephrology at dialysis clinic. Patient denies HA, dizziness, SOB, CP, abdominal pain, dysuria, acute myalgias or arthralgias. Hypertension:  The patient reports that she is taking medications as instructed, no medication side effects noted, no TIA's, no chest pain on exertion, no dyspnea on exertion, no swelling of ankles. Lifestyle modification/social history: sedentary     BP Readings from Last 3 Encounters:   07/18/17 162/75   02/14/17 179/59   02/08/17 (!) 208/78     Patient advised to log blood pressures at home 2-3 times weekly and bring to next visit. Call office as soon as possible if BP's over 140/90 on multiple occasions or with symptoms of dizziness, chest pain, shortness of breath, headache or ankle swelling. Our goal is to normalize the blood pressure to decrease the risks of strokes and heart attacks. The patient is in agreement with the plan.           Patient Active Problem List   Diagnosis Code    CHF (congestive heart failure) (HCC) I50.9    CVA (cerebral infarction) I63.9    Hyperlipidemia E78.5    HTN (hypertension) I10    Anemia, chronic disease D63.8    GERD (gastroesophageal reflux disease) K21.9    Vitamin D deficiency E55.9    Nausea R11.0    Constipation K59.00    Renal failure N19    Hyperkalemia E87.5    Renal failure (ARF), acute on chronic (HCC) N17.9, E31.8    Metabolic encephalopathy O02.61    Diastolic dysfunction U09.7    LVH (left ventricular hypertrophy) I51.7    UTI (lower urinary tract infection) N39.0    Type II diabetes mellitus with complication ([facility]) T55.4    Amputated right leg ([facility] [address] Z89.611     Past Surgical History:   Procedure Laterality Date    COLONOSCOPY  2/8/2017  COLONOSCOPY N/A 2/8/2017    COLONOSCOPY performed by Juventino Fair MD at [address] [facility] HX HEENT      cataract    HX OTHER SURGICAL      AV FISTULA LEFT ARM    HX REJI AND BSO       Social History     Social History    Marital status: SINGLE     Spouse name: N/A    Number of children: N/A    Years of education: N/A     Occupational History    Not on file. Social History Main Topics    Smoking status: Never Smoker    Smokeless tobacco: Never Used    Alcohol use No      Comment: no caffeine, lives with sister, enjoys reading   [employer] Drug use: No    Sexual activity: Not on file     Other Topics Concern    Not on file     Social History Narrative     History reviewed. No pertinent family history. Current Outpatient Prescriptions   Medication Sig    lisinopril (PRINIVIL, ZESTRIL) 40 mg tablet Take 40 mg by mouth daily.  hydrALAZINE (APRESOLINE) 25 mg tablet Take 25 mg by mouth three (3) times daily.  sevelamer carbonate (RENVELA) 800 mg tab tab Take 1,600 mg by mouth three (3) times daily (with meals).  LIPITOR 20 mg tablet TAKE 1 BY MOUTH DAILY    epoetin mark (PROCRIT) 10,000 unit/mL injection by SubCUTAneous route once.  amLODIPine (NORVASC) 10 mg tablet Take 1 Tab by mouth daily.  carvedilol (COREG) 25 mg tablet Take 1 Tab by mouth two (2) times daily (with meals). Lavona Alexi, 1 Each by Does Not Apply route daily. Prothesis for BKA of R leg     No current facility-administered medications for this visit.       Allergies   Allergen Reactions    Peanut Swelling       Review of Symptoms:  Constitutional: Negative for fever, chills, fatigue, malaise  Skin: Negative for rash or lesion  HEENT: Negative for acute hearing or vision changes  Resp: Negative for cough, wheezing or SOB  Cardiovascular: Negative for chest pain, dizziness or palpitations  Abdomen: Negative for nausea or abdominal pain  Genital/Urinary: ESRD on dialysis  Musculoskeltal: h/o R BKA, Negative for acute myalgias or arthralgias   Neurological: Negative for headache, weakness or paresthesia  Psychological: Negative for depression or anxiety     Vitals:    07/18/17 0936   BP: 162/75   Pulse: 78   Resp: 16   Temp: 97.1 °F (36.2 °C)   TempSrc: Oral   SpO2: 97%   Weight: 121 lb (54.9 kg)   Height: 5' 6\" (1.676 m)       Physical Exam:  General: Cooperative, no distress, appears stated age. Skin: Skin color, texture, turgor normal.  Head: Normocephalic, atraumatic. Eyes: Conjunctivae clear. PERRL, EOMs intact. Neck: FROM, no adenopathy  Lungs: Clear to auscultation bilaterally with symmetrical effort. Heart: Regular rate and rhythm, S1, S2 normal, II/VI VAUGHN  Abdomen: Soft, non-tender. Non-distended. Bowel sounds normal.   Extremities: right BKA, LLE with no edema  Neurologic:  normal strength and sensation  Psychological: Alert and oriented. Affect appropriate    Diagnoses and all orders for this visit:    1. Essential hypertension    2. Hyperlipidemia, unspecified hyperlipidemia type  -     LIPID PANEL    3. Diabetes mellitus type 2, diet-controlled (HCC)  -     HEMOGLOBIN A1C WITH EAG    4. CKD (chronic kidney disease), stage 5 ([facility] [address]           I have discussed the diagnosis with the patient and the intended plan as seen in the above orders. Importance of compliance with all prescribed medications discussed. The patient expresses understanding and agreement with our plan of care. All of the patient's questions were answered to apparent satisfaction. The patient has received an after-visit summary. The patient knows to call our office if there are any questions or concerns regarding diagnosis and treatment plans. I have discussed medication side effects and warnings with the patient as well. Follow-up Disposition:  Return in about 6 months (around 1/18/2018), or if symptoms worsen or fail to improve. Assessment/Plan       ICD-10-CM ICD-9-CM    1.  Routine general medical examination at a [facility] care facility Z00.00 V70.0    2. Screening for alcoholism Z13.89 V79.1 IA ANNUAL ALCOHOL SCREEN 15 MIN   3. Screening for depression Z13.89 V79.0    .

## 2018-01-01 ENCOUNTER — OFFICE VISIT (OUTPATIENT)
Dept: FAMILY MEDICINE CLINIC | Age: 68
End: 2018-01-01

## 2018-01-01 VITALS
BODY MASS INDEX: 19.77 KG/M2 | OXYGEN SATURATION: 96 % | DIASTOLIC BLOOD PRESSURE: 63 MMHG | TEMPERATURE: 97.8 F | RESPIRATION RATE: 16 BRPM | HEART RATE: 72 BPM | HEIGHT: 66 IN | SYSTOLIC BLOOD PRESSURE: 151 MMHG | WEIGHT: 123 LBS

## 2018-01-01 VITALS
HEIGHT: 66 IN | TEMPERATURE: 97.2 F | RESPIRATION RATE: 20 BRPM | OXYGEN SATURATION: 96 % | HEART RATE: 82 BPM | BODY MASS INDEX: 20.57 KG/M2 | DIASTOLIC BLOOD PRESSURE: 71 MMHG | WEIGHT: 128 LBS | SYSTOLIC BLOOD PRESSURE: 160 MMHG

## 2018-01-01 DIAGNOSIS — E11.21 TYPE 2 DIABETES MELLITUS WITH NEPHROPATHY (HCC): ICD-10-CM

## 2018-01-01 DIAGNOSIS — Z13.39 SCREENING FOR ALCOHOLISM: ICD-10-CM

## 2018-01-01 DIAGNOSIS — N18.6 END STAGE RENAL DISEASE (HCC): ICD-10-CM

## 2018-01-01 DIAGNOSIS — I10 ESSENTIAL HYPERTENSION: ICD-10-CM

## 2018-01-01 DIAGNOSIS — I50.42 CHRONIC COMBINED SYSTOLIC AND DIASTOLIC CONGESTIVE HEART FAILURE (HCC): ICD-10-CM

## 2018-01-01 DIAGNOSIS — S88.911A AMPUTATED RIGHT LEG (HCC): ICD-10-CM

## 2018-01-01 DIAGNOSIS — Z13.820 OSTEOPOROSIS SCREENING: ICD-10-CM

## 2018-01-01 DIAGNOSIS — Z00.00 MEDICARE ANNUAL WELLNESS VISIT, INITIAL: Primary | ICD-10-CM

## 2018-01-01 DIAGNOSIS — E11.8 TYPE 2 DIABETES MELLITUS WITH COMPLICATION, WITHOUT LONG-TERM CURRENT USE OF INSULIN (HCC): Primary | ICD-10-CM

## 2018-01-01 DIAGNOSIS — E78.5 HYPERLIPIDEMIA, UNSPECIFIED HYPERLIPIDEMIA TYPE: ICD-10-CM

## 2018-01-01 LAB
ALBUMIN SERPL-MCNC: 4.2 G/DL (ref 3.6–4.8)
ALBUMIN/GLOB SERPL: 1.4 {RATIO} (ref 1.2–2.2)
ALP SERPL-CCNC: 174 IU/L (ref 39–117)
ALT SERPL-CCNC: 18 IU/L (ref 0–32)
AST SERPL-CCNC: 19 IU/L (ref 0–40)
BILIRUB SERPL-MCNC: 0.4 MG/DL (ref 0–1.2)
BUN SERPL-MCNC: 38 MG/DL (ref 8–27)
BUN/CREAT SERPL: 6 (ref 12–28)
CALCIUM SERPL-MCNC: 9 MG/DL (ref 8.7–10.3)
CHLORIDE SERPL-SCNC: 99 MMOL/L (ref 96–106)
CHOLEST SERPL-MCNC: 114 MG/DL (ref 100–199)
CO2 SERPL-SCNC: 24 MMOL/L (ref 18–29)
CREAT SERPL-MCNC: 6.76 MG/DL (ref 0.57–1)
EST. AVERAGE GLUCOSE BLD GHB EST-MCNC: 94 MG/DL
GLOBULIN SER CALC-MCNC: 3 G/DL (ref 1.5–4.5)
GLUCOSE SERPL-MCNC: 94 MG/DL (ref 65–99)
HBA1C MFR BLD: 4.9 % (ref 4.8–5.6)
HDLC SERPL-MCNC: 58 MG/DL
LDLC SERPL CALC-MCNC: 53 MG/DL (ref 0–99)
POTASSIUM SERPL-SCNC: 5.2 MMOL/L (ref 3.5–5.2)
PROT SERPL-MCNC: 7.2 G/DL (ref 6–8.5)
SODIUM SERPL-SCNC: 144 MMOL/L (ref 134–144)
TRIGL SERPL-MCNC: 14 MG/DL (ref 0–149)
TSH SERPL DL<=0.005 MIU/L-ACNC: 0.02 UIU/ML (ref 0.45–4.5)
VLDLC SERPL CALC-MCNC: 3 MG/DL (ref 5–40)

## 2018-01-23 NOTE — PROGRESS NOTES
1. Have you been to the ER, urgent care clinic since your last visit? Hospitalized since your last visit? No    2. Have you seen or consulted any other health care providers outside of the [address] [place] [address] since your last visit? Include any pap smears or colon screening.  No  Reviewed record in preparation for visit and have necessary documentation  Pt did not bring medication to office visit for review  opportunity was given for questions  Goals that were addressed and/or need to be completed during or after this appointment include    Health Maintenance Due   Topic Date Due    DTaP/Tdap/Td series (1 - Tdap) 05/03/1970    ZOSTER VACCINE AGE 60>  03/03/2009    OSTEOPOROSIS SCREENING (DEXA)  05/03/2014    Pneumococcal 65+ High/Highest Risk (2 of 2 - PCV13) 04/01/2016    Influenza Age 9 to Adult  08/01/2017    FOOT EXAM Q1  11/07/2017    MICROALBUMIN Q1  11/07/2017    HEMOGLOBIN A1C Q6M  01/18/2018

## 2018-01-23 NOTE — MR AVS SNAPSHOT
[address] [address] 
[phone] Patient: Suzanna Hess MRN: [id number] QGN:[birth year] Visit Information Date & Time Provider Department Dept. Phone Encounter #  
 1/23/2018  9:20 AM Jennifer Johnson [facility] [place] [id number] Follow-up Instructions Return in about 6 months (around 7/23/2018), or if symptoms worsen or fail to improve. Upcoming Health Maintenance Date Due DTaP/Tdap/Td series (1 - Tdap) 5/3/1970 ZOSTER VACCINE AGE 60> 3/3/2009 OSTEOPOROSIS SCREENING (DEXA) 5/3/2014 Pneumococcal 65+ High/Highest Risk (2 of 2 - PCV13) 4/1/2016 Influenza Age 5 to Adult 8/1/2017 FOOT EXAM Q1 11/7/2017 MICROALBUMIN Q1 11/7/2017 HEMOGLOBIN A1C Q6M 1/18/2018 MEDICARE YEARLY EXAM 2/15/2018 EYE EXAM RETINAL OR DILATED Q1 4/21/2018 LIPID PANEL Q1 7/18/2018 GLAUCOMA SCREENING Q2Y 4/21/2019 BREAST CANCER SCRN MAMMOGRAM 11/2/2019 Allergies as of 1/23/2018  Review Complete On: 1/23/2018 By: Palak Carmona LPN Severity Noted Reaction Type Reactions Peanut Medium 02/25/2015    Swelling Current Immunizations  Never Reviewed Name Date Pneumococcal Polysaccharide (PPSV-23) 4/1/2015 Not reviewed this visit You Were Diagnosed With   
  
 Codes Comments Type 2 diabetes mellitus with complication, without long-term current use of insulin (HCC)    -  Primary ICD-10-CM: E11.8 ICD-9-CM: 250.90 Hyperlipidemia, unspecified hyperlipidemia type     ICD-10-CM: E78.5 ICD-9-CM: 272.4 Essential hypertension     ICD-10-CM: I10 
ICD-9-CM: 401.9 Vitals BP Pulse Temp Resp Height(growth percentile) Weight(growth percentile) 151/63 (BP 1 Location: Right arm, BP Patient Position: Sitting) 72 97.8 °F (36.6 °C) (Oral) 16 5' 6\" (1.676 m) 123 lb (55.8 kg) SpO2 BMI OB Status Smoking Status 96% 19.85 kg/m2 Hysterectomy Never Smoker Vitals History BMI and BSA Data Body Mass Index Body Surface Area  
 19.85 kg/m 2 1.61 m 2 Preferred Pharmacy Pharmacy Name Phone [address], [address] Your Updated Medication List  
  
   
This list is accurate as of: 1/23/18 10:06 AM.  Always use your most recent med list. amLODIPine 10 mg tablet Commonly known as:  Rulon Diana Take 1 Tab by mouth daily. carvedilol 25 mg tablet Commonly known as:  Delphina Braddock Take 1 Tab by mouth two (2) times daily (with meals). hydrALAZINE 25 mg tablet Commonly known as:  APRESOLINE Take 25 mg by mouth three (3) times daily. LIPITOR 20 mg tablet Generic drug:  atorvastatin TAKE 1 BY MOUTH DAILY  
  
 lisinopril 40 mg tablet Commonly known as:  Efrain [place] Take 40 mg by mouth daily. OTHER(NON-FORMULARY) 1 Each by Does Not Apply route daily. Prothesis for BKA of R leg PROCRIT 10,000 unit/mL injection Generic drug:  epoetin mark  
by SubCUTAneous route once. RENVELA 800 mg Tab tab Generic drug:  sevelamer carbonate Take 1,600 mg by mouth three (3) times daily (with meals). We Performed the Following HEMOGLOBIN A1C WITH EAG [12048 CPT(R)] LIPID PANEL [74275 CPT(R)] METABOLIC PANEL, COMPREHENSIVE [62247 CPT(R)] TSH 3RD GENERATION [89576 CPT(R)] Follow-up Instructions Return in about 6 months (around 7/23/2018), or if symptoms worsen or fail to improve. Introducing [facility]! Merle Kraft introduces [employer] patient portal. Now you can access parts of your medical record, email your doctor's office, and request medication refills online. 1. In your internet browser, go to [email or web address]. 2. Click on the First Time User? Click Here link in the Sign In box. You will see the New Member Sign Up page. 3. Enter your [employer] Access Code exactly as it appears below. You will not need to use this code after youve completed the sign-up process. If you do not sign up before the expiration date, you must request a new code. · [email or web address] Access Code: [facility] [place] Expires: 4/23/2018  9:03 AM 
 
4. Enter the last four digits of your Social Security Number (xxxx) and Date of Birth (mm/dd/yyyy) as indicated and click Submit. You will be taken to the next sign-up page. 5. Create a [employer] Chantilly Lacet ID. This will be your Justin.TV login ID and cannot be changed, so think of one that is secure and easy to remember. 6. Create a Justin[employer]TV password. You can change your password at any time. 7. Enter your Password Reset Question and Answer. This can be used at a later time if you forget your password. 8. Enter your e-mail address. You will receive e-mail notification when new information is available in [address]. 9. Click Sign Up. You can now view and download portions of your medical record. 10. Click the Download Summary menu link to download a portable copy of your medical information. If you have questions, please visit the Frequently Asked Questions section of the [employer] website. Remember, Justin[employer] is NOT to be used for urgent needs. For medical emergencies, dial 911. Now available from your iPhone and Android! Please provide this summary of care documentation to your next provider. Your primary care clinician is listed as Quirino Xiong. If you have any questions after today's visit, please call [phone].

## 2018-01-28 PROBLEM — E11.21 TYPE 2 DIABETES MELLITUS WITH NEPHROPATHY (HCC): Status: ACTIVE | Noted: 2018-01-01

## 2018-01-29 NOTE — PROGRESS NOTES
Chief Complaint   Patient presents with    Diabetes    CHF     she is a 75y.o. year old female who presents for follow up of chronic medical conditions. Medical history is significant for ESRD, HTN, HLD, DM2, anemia, GERD and vitamin D deficiency. HTN uncontrolled. BP monitored by nephrology at dialysis clinic. Patient denies HA, dizziness, SOB, CP, abdominal pain, dysuria, acute myalgias or arthralgias. Diabetes: This patient is being treating under a comprehensive plan of care for diabetes. Overall the patient feels well with good energy level. Insulin dependence: no   Pertinent Labs:   Lab Results   Component Value Date/Time    Hemoglobin A1c 4.9 01/23/2018 04:46 PM      Body mass index is 19.85 kg/(m^2). Lab Results   Component Value Date/Time    LDL, calculated 53 01/23/2018 04:46 PM        Wt Readings from Last 3 Encounters:   01/23/18 123 lb (55.8 kg)   07/18/17 121 lb (54.9 kg)   02/14/17 124 lb (56.2 kg)        History   Smoking Status    Never Smoker   Smokeless Tobacco    Never Used        Medications, diet and exercise as means of diabetic control with a goal of an A1C of less than 7.0% discussed. Diabetic foot care and annual eye exam discussed as well. Check blood sugars while fasting just before breakfast on most days and occasionally before dinner. Write down readings in a diabetic log book and bring them to the next visit. Call the office for fasting sugars over 200 or below 75 on two or more occasions. Call immediately if having symptoms of high sugar (frequent urination, always thirsty) or low sugar (dizzy, lethargic, sweaty, nauseated, headache). Our overall goal is to reduce or eliminate the long term consequences of poorly controlled diabetes. Patient expresses understanding and agreement with our plan of care.       Hypertension:  The patient reports that she is taking medications as instructed, no medication side effects noted, no TIA's, no chest pain on exertion, no dyspnea on exertion, no swelling of ankles. Lifestyle modification/social history: sedentary     BP Readings from Last 3 Encounters:   01/23/18 151/63   07/18/17 162/75   02/14/17 179/59     Lab Results   Component Value Date/Time    Sodium 144 01/23/2018 04:46 PM    Potassium 5.2 01/23/2018 04:46 PM    Chloride 99 01/23/2018 04:46 PM    CO2 24 01/23/2018 04:46 PM    Anion gap 6 03/14/2015 05:00 AM    Glucose 94 01/23/2018 04:46 PM    BUN 38 01/23/2018 04:46 PM    Creatinine 6.76 01/23/2018 04:46 PM    BUN/Creatinine ratio 6 01/23/2018 04:46 PM    GFR est AA 7 01/23/2018 04:46 PM    GFR est non-AA 6 01/23/2018 04:46 PM    Calcium 9.0 01/23/2018 04:46 PM    Bilirubin, total 0.4 01/23/2018 04:46 PM    ALT (SGPT) 18 01/23/2018 04:46 PM    AST (SGOT) 19 01/23/2018 04:46 PM    Alk. phosphatase 174 01/23/2018 04:46 PM    Protein, total 7.2 01/23/2018 04:46 PM    Albumin 4.2 01/23/2018 04:46 PM    Globulin 3.3 03/07/2015 04:00 AM    A-G Ratio 1.4 01/23/2018 04:46 PM          Patient advised to log blood pressures at home 2-3 times weekly and bring to next visit. Call office as soon as possible if BP's over 140/90 on multiple occasions or with symptoms of dizziness, chest pain, shortness of breath, headache or ankle swelling. Our goal is to normalize the blood pressure to decrease the risks of strokes and heart attacks. The patient is in agreement with the plan.           Patient Active Problem List   Diagnosis Code    CHF (congestive heart failure) (HCC) I50.9    CVA (cerebral infarction) I63.9    Hyperlipidemia E78.5    HTN (hypertension) I10    Anemia, chronic disease D63.8    GERD (gastroesophageal reflux disease) K21.9    Vitamin D deficiency E55.9    Nausea R11.0    Constipation K59.00    Renal failure N19    Hyperkalemia E87.5    Renal failure (ARF), acute on chronic (HCC) N17.9, T77.3    Metabolic encephalopathy S92.48    Diastolic dysfunction Z96.6    LVH (left ventricular hypertrophy) I51.7  UTI (lower urinary tract infection) N39.0    Type II diabetes mellitus with complication (HCC) O75.4    Amputated right leg ([facility] [address] Z89.611    [facility] E11.21     Past Surgical History:   Procedure Laterality Date    COLONOSCOPY  2/8/2017         COLONOSCOPY N/A 2/8/2017    COLONOSCOPY performed by Lennie Rocha MD at [address] [facility] HX HEENT      cataract    HX OTHER SURGICAL      AV FISTULA LEFT ARM    HX REJI AND BSO       Social History     Social History    Marital status: SINGLE     Spouse name: N/A    Number of children: N/A    Years of education: N/A     Occupational History    Not on file. Social History Main Topics    Smoking status: Never Smoker    Smokeless tobacco: Never Used    Alcohol use No      Comment: no caffeine, lives with sister, enjoys reading   [employer] Drug use: No    Sexual activity: Not on file     Other Topics Concern    Not on file     Social History Narrative     No family history on file. Current Outpatient Prescriptions   Medication Sig    LIPITOR 20 mg tablet TAKE 1 BY MOUTH DAILY    lisinopril (PRINIVIL, ZESTRIL) 40 mg tablet Take 40 mg by mouth daily.  hydrALAZINE (APRESOLINE) 25 mg tablet Take 25 mg by mouth three (3) times daily.  sevelamer carbonate (RENVELA) 800 mg tab tab Take 1,600 mg by mouth three (3) times daily (with meals).  epoetin mark (PROCRIT) 10,000 unit/mL injection by SubCUTAneous route once.  amLODIPine (NORVASC) 10 mg tablet Take 1 Tab by mouth daily.  carvedilol (COREG) 25 mg tablet Take 1 Tab by mouth two (2) times daily (with meals). Emily Garcia, 1 Each by Does Not Apply route daily. Prothesis for BKA of R leg     No current facility-administered medications for this visit.       Allergies   Allergen Reactions    Peanut Swelling       Review of Symptoms:  Constitutional: Negative for fever, chills, fatigue, malaise  Skin: Negative for rash or lesion  HEENT: Negative for acute hearing or vision changes  Resp: Negative for cough, wheezing or SOB  Cardiovascular: Negative for chest pain, dizziness or palpitations  Abdomen: Negative for nausea or abdominal pain  Genital/Urinary: ESRD on dialysis  Musculoskeltal: h/o R BKA, Negative for acute myalgias or arthralgias   Neurological: Negative for headache, weakness or paresthesia  Psychological: Negative for depression or anxiety     Vitals:    01/23/18 0936   BP: 151/63   Pulse: 72   Resp: 16   Temp: 97.8 °F (36.6 °C)   TempSrc: Oral   SpO2: 96%   Weight: 123 lb (55.8 kg)   Height: 5' 6\" (1.676 m)       Physical Exam:  General: Cooperative, no distress, appears stated age. Skin: Skin color, texture, turgor normal.  Head: Normocephalic, atraumatic. Eyes: Conjunctivae clear, EOMI PERRL,  Neck: FROM, no adenopathy  Lungs: Clear to auscultation bilaterally with symmetrical effort. Heart: Regular rate and rhythm, S1, S2 normal, II/VI VAUGHN  Abdomen: Soft, non-tender. Non-distended. Bowel sounds normal.   Extremities: right BKA, LLE with no edema  Foot: left foot with normal sensation, no lesion   Neurologic:  normal strength and sensation  Psychological: Alert and oriented. Affect appropriate    Diagnoses and all orders for this visit:    1. Type 2 diabetes mellitus with complication, without long-term current use of insulin (HCC)  -     METABOLIC PANEL, COMPREHENSIVE  -     HEMOGLOBIN A1C WITH EAG  -      DIABETES FOOT EXAM    2. Type 2 diabetes mellitus with nephropathy (HCC)  -     METABOLIC PANEL, COMPREHENSIVE  -     HEMOGLOBIN A1C WITH EAG    3. Hyperlipidemia, unspecified hyperlipidemia type  -     LIPID PANEL  -     METABOLIC PANEL, COMPREHENSIVE    4. Essential hypertension  -     METABOLIC PANEL, COMPREHENSIVE  -     TSH 3RD GENERATION           I have discussed the diagnosis with the patient and the intended plan as seen in the above orders. Importance of compliance with all prescribed medications discussed.    The patient expresses understanding and agreement with our plan of care. All of the patient's questions were answered to apparent satisfaction. The patient has received an after-visit summary. The patient knows to call our office if there are any questions or concerns regarding diagnosis and treatment plans. I have discussed medication side effects and warnings with the patient as well. Follow-up Disposition:  Return in about 6 months (around 7/23/2018), or if symptoms worsen or fail to improve.

## 2018-01-29 NOTE — PATIENT INSTRUCTIONS
Diabetes Foot Health: Care Instructions  Your Care Instructions    When you have diabetes, your feet need extra care and attention. Diabetes can damage the nerve endings and blood vessels in your feet, making you less likely to notice when your feet are injured. Diabetes also limits your body's ability to fight infection and get blood to areas that need it. If you get a minor foot injury, it could become an ulcer or a serious infection. With good foot care, you can prevent most of these problems. Caring for your feet can be quick and easy. Most of the care can be done when you are bathing or getting ready for bed. Follow-up care is a key part of your treatment and safety. Be sure to make and go to all appointments, and call your doctor if you are having problems. It's also a good idea to know your test results and keep a list of the medicines you take. How can you care for yourself at home? · Keep your blood sugar close to normal by watching what and how much you eat, monitoring blood sugar, taking medicines if prescribed, and getting regular exercise. · Do not smoke. Smoking affects blood flow and can make foot problems worse. If you need help quitting, talk to your doctor about stop-smoking programs and medicines. These can increase your chances of quitting for good. · Eat a diet that is low in fats. High fat intake can cause fat to build up in your blood vessels and decrease blood flow. · Inspect your feet daily for blisters, cuts, cracks, or sores. If you cannot see well, use a mirror or have someone help you. · Take care of your feet:  [facility] your feet every day. Use warm (not hot) water. Check the water temperature with your wrists or other part of your body, not your feet. ¨ Dry your feet well. Pat them dry. Do not rub the skin on your feet too hard. Dry well between your toes. If the skin on your feet stays moist, bacteria or a fungus can grow, which can lead to infection.   ¨ Keep your skin soft. Use moisturizing skin cream to keep the skin on your feet soft and prevent calluses and cracks. But do not put the cream between your toes, and stop using any cream that causes a rash. ¨ Clean underneath your toenails carefully. Do not use a sharp object to clean underneath your toenails. Use the blunt end of a nail file or other rounded tool. ¨ Trim and file your toenails straight across to prevent ingrown toenails. Use a nail clipper, not scissors. Use an emery board to smooth the edges. · Change socks daily. Socks without seams are best, because seams often rub the feet. You can find socks for people with diabetes from specialty catalogs. · Look inside your shoes every day for things like gravel or torn linings, which could cause blisters or sores. · Buy shoes that fit well:  ¨ Look for shoes that have plenty of space around the toes. This helps prevent bunions and blisters. ¨ Try on shoes while wearing the kind of socks you will usually wear with the shoes. ¨ Avoid plastic shoes. They may rub your feet and cause blisters. Good shoes should be made of materials that are flexible and breathable, such as leather or cloth. ¨ Break in new shoes slowly by wearing them for no more than an hour a day for several days. Take extra time to check your feet for red areas, blisters, or other problems after you wear new shoes. · Do not go barefoot. Do not wear sandals, and do not wear shoes with very thin soles. Thin soles are easy to puncture. They also do not protect your feet from hot pavement or cold weather. · Have your doctor check your feet during each visit. If you have a foot problem, see your doctor. Do not try to treat an early foot problem at home. Home remedies or treatments that you can buy without a prescription (such as corn removers) can be harmful. · Always get early treatment for foot problems. A minor irritation can lead to a major problem if not properly cared for early.   When should you call for help? Call your doctor now or seek immediate medical care if:  ? · You have a foot sore, an ulcer or break in the skin that is not healing after 4 days, bleeding corns or calluses, or an ingrown toenail. ? · You have blue or black areas, which can mean bruising or blood flow problems. ? · You have peeling skin or tiny blisters between your toes or cracking or oozing of the skin. ? · You have a fever for more than 24 hours and a foot sore. ? · You have new numbness or tingling in your feet that does not go away after you move your feet or change positions. ? · You have unexplained or unusual swelling of the foot or ankle. ? Watch closely for changes in your health, and be sure to contact your doctor if:  ? · You cannot do proper foot care. Where can you learn more? Go to http://george-junior.info/. Enter A739 in the search box to learn more about \"Diabetes Foot Health: Care Instructions. \"  Current as of: March 13, 2017  Content Version: 11.4  © 9866-3686 [employer]. Care instructions adapted under license by [employer] (which disclaims liability or warranty for this information). If you have questions about a medical condition or this instruction, always ask your healthcare professional. [address] any warranty or liability for your use of this information.

## 2018-03-27 PROBLEM — N18.6 END STAGE RENAL DISEASE (HCC): Status: ACTIVE | Noted: 2018-01-01

## 2018-03-27 NOTE — PROGRESS NOTES
Date of visit: 3/27/2018       This is a Subsequent Medicare Annual Wellness Visit (AWV), (Performed more than 12 months after effective date of Medicare Part B enrollment and 12 months after last preventive visit, Once in a lifetime)    I have reviewed the patient's medical history in detail and updated the computerized patient record. Debbie Hayes is a 75 y.o. female   History obtained from: the patient.     Concerns today   Patient understands that medical problems addressed today may incur additional cost as this is a preventive visit    History     Patient Active Problem List   Diagnosis Code    CHF (congestive heart failure) (HCC) I50.9    CVA (cerebral infarction) I63.9    Hyperlipidemia E78.5    HTN (hypertension) I10    Anemia, chronic disease D63.8    GERD (gastroesophageal reflux disease) K21.9    Vitamin D deficiency E55.9    Nausea R11.0    Constipation K59.00    Renal failure N19    Hyperkalemia E87.5    Renal failure (ARF), acute on chronic (HCC) N17.9, H99.4    Metabolic encephalopathy P16.99    Diastolic dysfunction I10.5    LVH (left ventricular hypertrophy) I51.7    UTI (lower urinary tract infection) N39.0    Type II diabetes mellitus with complication ([facility]) H11.0    Amputated right leg ([facility] [address] Z89.611    Type 2 diabetes mellitus with nephropathy ([facility] [address] E11.21     Past Medical History:   Diagnosis Date    Anemia     CHF (congestive heart failure) (HCC)     Chronic kidney disease     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 ([facility] [address]     CVA (cerebral infarction)     September 2010 [facility] [address] [facility] Diabetes [facility])     HTN (hypertension)     Hyperlipidemia     Hypoglycemia     [facility]    Renal failure     Stroke [facility])       Past Surgical History:   Procedure Laterality Date    COLONOSCOPY  2/8/2017         COLONOSCOPY N/A 2/8/2017    COL[facility] Lemos [facility] ENDOSCOPY    HX [facility] cataract    HX OTHER SURGICAL      AV FISTULA LEFT ARM    HX REJI AND BSO       Allergies   Allergen Reactions    Peanut Swelling     Current Outpatient Prescriptions   Medication Sig Dispense Refill    varicella zoster vaccine live (ZOSTAVAX) 19,400 unit/0.65 mL susr injection 1 Vial by SubCUTAneous route once for 1 dose. 0.65 mL 0    diph,pertuss,acel,,tetanus vac,PF, (ADACEL) 2 Lf-(2.5-5-3-5 mcg)-5Lf/0.5 mL syrg vaccine 0.5 mL by IntraMUSCular route once for 1 dose. 0.5 mL 0    lisinopril (PRINIVIL, ZESTRIL) 40 mg tablet Take 40 mg by mouth daily.  hydrALAZINE (APRESOLINE) 25 mg tablet Take 25 mg by mouth three (3) times daily.  sevelamer carbonate (RENVELA) 800 mg tab tab Take 1,600 mg by mouth three (3) times daily (with meals).  epoetin mark (PROCRIT) 10,000 unit/mL injection by SubCUTAneous route once.  amLODIPine (NORVASC) 10 mg tablet Take 1 Tab by mouth daily. 90 Tab 3    carvedilol (COREG) 25 mg tablet Take 1 Tab by mouth two (2) times daily (with meals). 60 Tab 0    OTHER,NON-FORMULARY, 1 Each by Does Not Apply route daily. Prothesis for BKA of R leg 1 Each 0    LIPITOR 20 mg tablet TAKE 1 BY MOUTH DAILY 30 Tab 3     History reviewed. No pertinent family history. Social History   Substance Use Topics    Smoking status: Never Smoker    Smokeless tobacco: Never Used    Alcohol use No      Comment: no caffeine, lives with sister, enjoys reading       Specialists/Care Team   Ismael Burk has established care with the following healthcare providers:  Patient Care Team:  Jose Guadalupe Medina MD as PCP - General (Family Practice)  Brenden Crocker MD (Cardiology)  Artsam Lara OD (Optometry)  You Asencio MD (Ophthalmology)    Health Risk Assessment     Demographics   female  75 y.o.     General Health Questions   -During the past 4 weeks:   -how would you rate your health in general? Good   -how often have you been bothered by feeling dizzy when standing up? never   -how much have you been bothered by bodily pain? Not really,  she complains of cramping in the left thigh after dialysis, but goes away without medication. Pt has been on dialysis since 2015   -Have you noticed any hearing difficulties? no   -has your physical and emotional health limited your social activities with family or friends? No, I still go out . Emotional Health Questions   -Do you have a history of depression, anxiety, or emotional problems? no  -Over the past 2 weeks, have you felt down, depressed or hopeless? no  -Over the past 2 weeks, have you felt little interest or pleasure in doing things? Yes, she has been in the house because of the weird snow  /odd weather. Health Habits   Please describe your diet habits: She eats 3 meals a day, endorses a good appetite. Pt follows the dialysis diet she was given. Do you get 5 servings of fruits or vegetables daily? yes  Do you exercise regularly? yes  Alcohol Use: None      Activities of Daily Living and Functional Status   -Do you need help with eating, walking, dressing, bathing, toileting, the phone, transportation, shopping, preparing meals, housework, laundry, medications or managing money? No, I do all those things    -In the past four weeks, was someone available to help you if you needed and wanted help with anything? Yes   -Are you confident are you that you can control and manage most of your health problems? yes  -Have you been given information to help you keep track of your medications? yes  -How often do you have trouble taking your medications as prescribed? never    Fall Risk and Home Safety   Have you fallen 2 or more times in the past year? no  Does your home have rugs in the hallway, lack grab bars in the bathroom, lack handrails on the stairs or have poor lighting? No, pt reports she does not have stairs. She denies poor lighting  Do you have smoke detectors and check them regularly?  yes  Do you have difficulties driving a car? no  Do you always fasten your seat belt when you are in a car? yes    Review of Systems (if indicated for problems addressed today)   No problems today    Physical Examination     Vitals:    03/27/18 0912   BP: 160/71   Pulse: 82   Resp: 20   Temp: 97.2 °F (36.2 °C)   TempSrc: Oral   SpO2: 96%   Weight: 128 lb (58.1 kg)   Height: 5' 6\" (1.676 m)     Body mass index is 20.66 kg/(m^2). No exam data present  Was the patient's timed Up & Go test unsteady or longer than 30 seconds? yes    Evaluation of Cognitive Function   Mood/affect:  neutral  Orientation: Person, Place, Time, Situation and Other,   Appearance: age appropriate, casually dressed and well dressed  Family member/caregiver input: No family here today    Additional exam if indicated for problems addressed today:      Advice/Referrals/Counseling (as indicated)   Education and counseling provided for any problems identified above.     Preventive Services     (Preventive care checklist to be included in patient instructions)  Discussed today Done Previously Not Needed     Pt states she has had her pneumo shots at dialysis  Pneumococcal vaccines    x x Flu vaccine      Hepatitis B vaccine (if at risk)   Yes discussed  Will order it pt reports she held off in the past due dialysis testing, pt will check with her dialysis unit before    Shingles vaccine   Script given today    TDAP vaccine    Completed on 11/2017  Will send referral 11/2018  Mammogram    Completed on 9/6/2016  The transplant  Center asking for report   Pap smear    Completed 1/2018 , pt had benign polyps   Colorectal cancer screening     x Low-dose CT for lung cancer screening    Referral sent today   Bone density test    Pt goes to Dr. Florin Lopes , she last went in 2017  Pt has cataracts  Glaucoma screening    1/23/2018  Cholesterol test    1/23/2018  Diabetes screening test     Pt is diet controlled not requiring insulin  Diabetes self-management class    Pt has classes with dialysis center   Nutritionist referral for diabetes or renal disease     Discussion of Advance Directive   Discussed with Willie Cheatham her ability to prepare and advance directive in the case that an injury or illness causes her to be unable to make health care decisions. Assessment/Plan   V70.0    ICD-10-CM ICD-9-CM    1. Medicare annual wellness visit, initial Z00.00 V70.0 varicella zoster vaccine live (ZOSTAVAX) 19,400 unit/0.65 mL susr injection      DEXA BONE DENSITY STUDY AXIAL      diph,pertuss,acel,,tetanus vac,PF, (ADACEL) 2 Lf-(2.5-5-3-5 mcg)-5Lf/0.5 mL syrg vaccine   2. Osteoporosis screening Z13.820 V82.81 DEXA BONE DENSITY STUDY AXIAL   3. Screening for alcoholism Z13.89 V79.1 [place] ANNUAL ALCOHOL SCREEN 15 MIN       Orders Placed This Encounter    Dexa Bone Density Study Axial ([id number])    Annual  Alcohol Screen 15 min ()    varicella zoster vaccine live (ZOSTAVAX) 19,400 unit/0.65 mL susr injection    diph,pertuss,acel,,tetanus vac,PF, (ADACEL) 2 Lf-(2.5-5-3-5 mcg)-5Lf/0.5 mL syrg vaccine       Follow-up Disposition:  Return in about 4 weeks (around 4/24/2018) for routine follow up , immunizations follow up . Lizbeth Ramsey MD            This is the Subsequent Medicare Annual Wellness Exam, performed 12 months or more after the Initial AWV or the last Subsequent AWV    I have reviewed the patient's medical history in detail and updated the computerized patient record.      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)     HTN (hypertension)     Hyperlipidemia     Hypoglycemia     [facility]    Renal failure     Stroke [facility])       Past Surgical History:   Procedure Laterality Date    COLONOSCOPY 2/8/2017         COLONOSCOPY N/A 2/8/2017    COLONOSCOPY performed by Cyndi Gorman MD at [address] [facility] HX HEENT      cataract    HX OTHER SURGICAL      AV FISTULA LEFT ARM    HX REJI AND BSO       Current Outpatient Prescriptions   Medication Sig Dispense Refill    varicella zoster vaccine live (ZOSTAVAX) 19,400 unit/0.65 mL susr injection 1 Vial by SubCUTAneous route once for 1 dose. 0.65 mL 0    diph,pertuss,acel,,tetanus vac,PF, (ADACEL) 2 Lf-(2.5-5-3-5 mcg)-5Lf/0.5 mL syrg vaccine 0.5 mL by IntraMUSCular route once for 1 dose. 0.5 mL 0    lisinopril (PRINIVIL, ZESTRIL) 40 mg tablet Take 40 mg by mouth daily.  hydrALAZINE (APRESOLINE) 25 mg tablet Take 25 mg by mouth three (3) times daily.  sevelamer carbonate (RENVELA) 800 mg tab tab Take 1,600 mg by mouth three (3) times daily (with meals).  epoetin mark (PROCRIT) 10,000 unit/mL injection by SubCUTAneous route once.  amLODIPine (NORVASC) 10 mg tablet Take 1 Tab by mouth daily. 90 Tab 3    carvedilol (COREG) 25 mg tablet Take 1 Tab by mouth two (2) times daily (with meals). 60 Tab 0    OTHER,NON-FORMULARY, 1 Each by Does Not Apply route daily. Prothesis for BKA of R leg 1 Each 0    LIPITOR 20 mg tablet TAKE 1 BY MOUTH DAILY 30 Tab 3     Allergies   Allergen Reactions    Peanut Swelling     History reviewed. No pertinent family history.   Social History   Substance Use Topics    Smoking status: Never Smoker    Smokeless tobacco: Never Used    Alcohol use No      Comment: no caffeine, lives with sister, enjoys reading     Patient Active Problem List   Diagnosis Code    CHF (congestive heart failure) (HCC) I50.9    CVA (cerebral infarction) I63.9    Hyperlipidemia E78.5    HTN (hypertension) I10    Anemia, chronic disease D63.8    GERD (gastroesophageal reflux disease) K21.9    Vitamin D deficiency E55.9    Nausea R11.0    Constipation K59.00    Renal failure N19    Hyperkalemia E87.5    Renal failure (ARF), acute on chronic (HCC) N17.9, B41.3    Metabolic encephalopathy L96.22    Diastolic dysfunction M74.7    LVH (left ventricular hypertrophy) I51.7    UTI (lower urinary tract infection) N39.0    Type II diabetes mellitus with complication (HCC) K34.1    Amputated right leg ([address] Z89.611    Type 2 diabetes mellitus with nephropathy (HCC) E11.21       Depression Risk Factor Screening:     PHQ over the last two weeks 6/30/2016   Little interest or pleasure in doing things Not at all   Feeling down, depressed or hopeless Not at all   Total Score PHQ 2 0     Alcohol Risk Factor Screening: You do not drink alcohol or very rarely. Functional Ability and Level of Safety:   Hearing Loss  Hearing is good. Activities of Daily Living  The home contains: no safety equipment. Patient does total self care    Fall Risk  Fall Risk Assessment, last 12 mths 6/30/2016   Able to walk? Yes   Fall in past 12 months? No       Abuse Screen  Patient is not abused She lives with her sister,feels safe at home     Cognitive Screening   Evaluation of Cognitive Function:  Has your family/caregiver stated any concerns about your memory: no  Normal, Mini Cog test    Patient Care Team   Patient Care Team:  Jes Lopez MD as PCP - General (Family Practice)  Grecia Higgins MD (Cardiology)  Colton Foster OD (Optometry)  Ghada Diaz MD (Ophthalmology)    Assessment/Plan   Education and counseling provided:  Are appropriate based on today's review and evaluation  End-of-Life planning (with patient's consent)  Pneumococcal Vaccine  Bone mass measurement (DEXA)  Shingles vaccine     Diagnoses and all orders for this visit:    1. Medicare annual wellness visit, initial  -     varicella zoster vaccine live (ZOSTAVAX) 19,400 unit/0.65 mL susr injection; 1 Vial by SubCUTAneous route once for 1 dose. -     Dexa Bone Density Study Axial ([id number]);  Future  -     diph,pertuss,acel,,tetanus vac,PF, (ADACEL) 2 Lf-(2.5-5-3-5 mcg)-5Lf/0.5 mL syrg vaccine; 0.5 mL by IntraMUSCular route once for 1 dose. 2. Osteoporosis screening  -     Dexa Bone Density Study Axial ([id number]); Future    3.  Screening for alcoholism  -     Annual  Alcohol Screen 15 min ()        Health Maintenance Due   Topic Date Due    DTaP/Tdap/Td series (1 - Tdap) 05/03/1970    EYE EXAM RETINAL OR DILATED Q1  04/21/2018

## 2018-03-27 NOTE — PATIENT INSTRUCTIONS
Medicare Wellness Visit, Female    The best way to live healthy is to have a healthy lifestyle by eating a well-balanced diet, exercising regularly, limiting alcohol and stopping smoking. Regular physical exams and screening tests are another way to keep healthy. Preventive exams provided by your health care provider can find health problems before they become diseases or illnesses. Preventive services including immunizations, screening tests, monitoring and exams can help you take care of your own health. All people over age 72 should have a pneumovax  and and a prevnar shot to prevent pneumonia. These are once in a lifetime unless you and your provider decide differently. All people over 65 should have a yearly flu shot and a tetanus vaccine every 10 years. A bone mass density to screen for osteoporosis or thinning of the bones should be done every 2 years after 65. Screening for diabetes mellitus with a blood sugar test should be done every year. Glaucoma is a disease of the eye due to increased ocular pressure that can lead to blindness and it should be done every year by an eye professional.    Cardiovascular screening tests that check for elevated lipids (fatty part of blood) which can lead to heart disease and strokes should be done every 5 years. Colorectal screening that evaluates for blood or polyps in your colon should be done yearly as a stool test or every five years as a flexible sigmoidoscope or every 10 years as a colonoscopy up to age 76. Breast cancer screening with a mammogram is recommended biennially  for women age 54-69. Screening for cervical cancer with a pap smear and pelvic exam is recommended for women after age 72 years every 2 years up to age 79 or when the provider and patient decide to stop. If there is a history of cervical abnormalities or other increased risk for cancer then the test is recommended yearly.     Hepatitis C screening is also recommended for anyone born between [birth year] through [address]. A shingles vaccine is also recommended once in a lifetime after age [facility]. Your Medicare Wellness Exam is recommended annually. Here is a list of your current Health Maintenance items with a due date:  Health Maintenance Due   Topic Date Due    DTaP/Tdap/Td  (1 - Tdap) 05/03/1970    Eye Exam  04/21/2018          Well Visit, Over 72: Care Instructions  Your Care Instructions    Physical exams can help you stay healthy. Your doctor has checked your overall health and may have suggested ways to take good care of yourself. He or she also may have recommended tests. At home, you can help prevent illness with healthy eating, regular exercise, and other steps. Follow-up care is a key part of your treatment and safety. Be sure to make and go to all appointments, and call your doctor if you are having problems. It's also a good idea to know your test results and keep a list of the medicines you take. How can you care for yourself at home? · Reach and stay at a healthy weight. This will lower your risk for many problems, such as obesity, diabetes, heart disease, and high blood pressure. · Get at least 30 minutes of exercise on most days of the week. Walking is a good choice. You also may want to do other activities, such as running, swimming, cycling, or playing tennis or team sports. · Do not smoke. Smoking can make health problems worse. If you need help quitting, talk to your doctor about stop-smoking programs and medicines. These can increase your chances of quitting for good. · Protect your skin from too much sun. When you're outdoors from 10 a.m. to 4 p.m., stay in the shade or cover up with clothing and a hat with a wide brim. Wear sunglasses that block UV rays. Even when it's cloudy, put broad-spectrum sunscreen (SPF 30 or higher) on any exposed skin. · See a dentist one or two times a year for checkups and to have your teeth cleaned.   · Wear a seat belt in the car.  · Limit alcohol to 2 drinks a day for men and 1 drink a day for women. Too much alcohol can cause health problems. Follow your doctor's advice about when to have certain tests. These tests can spot problems early. For men and women  · Cholesterol. Your doctor will tell you how often to have this done based on your overall health and other things that can increase your risk for heart attack and stroke. · Blood pressure. Have your blood pressure checked during a routine doctor visit. Your doctor will tell you how often to check your blood pressure based on your age, your blood pressure results, and other factors. · Diabetes. Ask your doctor whether you should have tests for diabetes. · Vision. Experts recommend that you have yearly exams for glaucoma and other age-related eye problems. · Hearing. Tell your doctor if you notice any change in your hearing. You can have tests to find out how well you hear. · Colon cancer tests. Keep having colon cancer tests as your doctor recommends. You can have one of several types of tests. · Heart attack and stroke risk. At least every 4 to 6 years, you should have your risk for heart attack and stroke assessed. Your doctor uses factors such as your age, blood pressure, cholesterol, and whether you smoke or have diabetes to show what your risk for a heart attack or stroke is over the next 10 years. · Osteoporosis. Talk to your doctor about whether you should have a bone density test to find out whether you have thinning bones. Also ask your doctor about whether you should take calcium and vitamin D supplements. For women  · Pap test and pelvic exam. You may no longer need a Pap test. Talk with your doctor about whether to stop or continue to have Pap tests. · Breast exam and mammogram. Ask how often you should have a mammogram, which is an X-ray of your breasts. A mammogram can spot breast cancer before it can be felt and when it is easiest to treat.   · Thyroid disease. Talk to your doctor about whether to have your thyroid checked as part of a regular physical exam. Women have an increased chance of a thyroid problem. For men  · Prostate exam. Talk to your doctor about whether you should have a blood test (called a PSA test) for prostate cancer. Experts disagree on whether men should have this test. Some experts recommend that you discuss the benefits and risks of the test with your doctor. · Abdominal aortic aneurysm. Ask your doctor whether you should have a test to check for an aneurysm. You may need a test if you ever smoked or if your parent, brother, sister, or child has had an aneurysm. When should you call for help? Watch closely for changes in your health, and be sure to contact your doctor if you have any problems or symptoms that concern you. Where can you learn more? Go to http://george-junior.info/. Enter I515 in the search box to learn more about \"Well Visit, Over 65: Care Instructions. \"  Current as of: May 12, 2017  Content Version: 11.4  © 7473-6804 Healthwise, Incorporated. Care instructions adapted under license by [employer] (which disclaims liability or warranty for this information). If you have questions about a medical condition or this instruction, always ask your healthcare professional. [address] any warranty or liability for your use of this information.

## 2018-03-27 NOTE — MR AVS SNAPSHOT
[facility] 
 
 
 [address] [facility] [address] [address] 
[phone] Patient: Zita Guzman MRN: [id number] WQI:[id number] Visit Information Date & Time Provider Department Dept. Phone Encounter #  
 3/27/2018  8:50 AM Sunni Cabral MD [address] [place] [id number] Follow-up Instructions Return in about 4 weeks (around 4/24/2018) for routine follow up , immunizations follow up . Your Appointments 7/24/2018  8:20 AM  
ROUTINE CARE with Kacy Burns MD  
[facility] Appt Note: 6 mo [id number]  
 2005 [address] [facility] [address] [address]  
[facility] [address] [address] Upcoming Health Maintenance Date Due DTaP/Tdap/Td series (1 - Tdap) 5/3/1970 EYE EXAM RETINAL OR DILATED Q1 4/21/2018 Pneumococcal 65+ High/Highest Risk (2 of 2 - PCV13) 4/28/2018* MICROALBUMIN Q1 4/28/2018* HEMOGLOBIN A1C Q6M 7/23/2018 LIPID PANEL Q1 1/23/2019 FOOT EXAM Q1 1/28/2019 MEDICARE YEARLY EXAM 3/28/2019 GLAUCOMA SCREENING Q2Y 4/21/2019 BREAST CANCER SCRN MAMMOGRAM 11/2/2019 COLONOSCOPY 2/13/2021 *Topic was postponed. The date shown is not the original due date. Allergies as of 3/27/2018  Review Complete On: 3/27/2018 By: Charis Fajardo Severity Noted Reaction Type Reactions Peanut Medium 02/25/2015    Swelling Current Immunizations  Never Reviewed Name Date Pneumococcal Polysaccharide (PPSV-23) 4/1/2015 Not reviewed this visit You Were Diagnosed With   
  
 Codes Comments Medicare annual wellness visit, initial    -  Primary ICD-10-CM: Z00.00 ICD-9-CM: V70.0 Osteoporosis screening     ICD-10-CM: Z13.820 ICD-9-CM: V82.81 Screening for alcoholism     ICD-10-CM: Z13.89 ICD-9-CM: V79.1 Vitals BP Pulse Temp Resp Height(growth percentile) Weight(growth percentile) 160/71 82 97.2 °F (36.2 °C) (Oral) 20 5' 6\" (1.676 m) 128 lb (58.1 kg) SpO2 BMI OB Status Smoking Status 96% 20.66 kg/m2 Hysterectomy Never Smoker Vitals History BMI and BSA Data Body Mass Index Body Surface Area  
 20.66 kg/m 2 1.64 m 2 Preferred Pharmacy Pharmacy Name Phone [facility], [address] Your Updated Medication List  
  
   
This list is accurate as of 3/27/18  9:48 AM.  Always use your most recent med list. amLODIPine 10 mg tablet Commonly known as:  Senora Dulce Take 1 Tab by mouth daily. carvedilol 25 mg tablet Commonly known as:  Stacey Yuli Take 1 Tab by mouth two (2) times daily (with meals). diph,pertuss(acel),tetanus vac(PF) 2 Lf-(2.5-5-3-5 mcg)-5Lf/0.5 mL Syrg vaccine Commonly known as:  ADACEL  
0.5 mL by IntraMUSCular route once for 1 dose.  
  
 hydrALAZINE 25 mg tablet Commonly known as:  APRESOLINE Take 25 mg by mouth three (3) times daily. LIPITOR 20 mg tablet Generic drug:  atorvastatin TAKE 1 BY MOUTH DAILY  
  
 lisinopril 40 mg tablet Commonly known as:  Rheta Lele Take 40 mg by mouth daily. OTHER(NON-FORMULARY) 1 Each by Does Not Apply route daily. Prothesis for BKA of R leg PROCRIT 10,000 unit/mL injection Generic drug:  epoetin mark  
by SubCUTAneous route once. RENVELA 800 mg Tab tab Generic drug:  sevelamer carbonate Take 1,600 mg by mouth three (3) times daily (with meals). varicella zoster vaccine live 19,400 unit/0.65 mL Susr injection Commonly known as:  ZOSTAVAX  
1 Vial by SubCUTAneous route once for 1 dose. Prescriptions Printed Refills diph,pertuss,acel,,tetanus vac,PF, (ADACEL) 2 Lf-(2.5-5-3-5 mcg)-5Lf/0.5 mL syrg vaccine 0 Si.5 mL by IntraMUSCular route once for 1 dose. Class: Print Route: IntraMUSCular Prescriptions Sent to Pharmacy Refills  
 varicella zoster vaccine live (ZOSTAVAX) 19,400 unit/0.65 mL susr injection 0 Si Vial by SubCUTAneous route once for 1 dose. Class: Normal  
 Pharmacy: [address] Ph #: [phone] Route: SubCUTAneous We Performed the Following [place] ANNUAL ALCOHOL SCREEN 15 MIN [id number] [facility]] Follow-up Instructions Return in about 4 weeks (around 2018) for routine follow up , immunizations follow up . To-Do List   
 2018 Imaging:  DEXA BONE DENSITY STUDY AXIAL Patient Instructions Medicare Wellness Visit, Female The best way to live healthy is to have a healthy lifestyle by eating a well-balanced diet, exercising regularly, limiting alcohol and stopping smoking. Regular physical exams and screening tests are another way to keep healthy. Preventive exams provided by your health care provider can find health problems before they become diseases or illnesses. Preventive services including immunizations, screening tests, monitoring and exams can help you take care of your own health. All people over age 72 should have a pneumovax  and and a prevnar shot to prevent pneumonia. These are once in a lifetime unless you and your provider decide differently. All people over 65 should have a yearly flu shot and a tetanus vaccine every 10 years. A bone mass density to screen for osteoporosis or thinning of the bones should be done every 2 years after 65. Screening for diabetes mellitus with a blood sugar test should be done every year. Glaucoma is a disease of the eye due to increased ocular pressure that can lead to blindness and it should be done every year by an eye professional. 
 
Cardiovascular screening tests that check for elevated lipids (fatty part of blood) which can lead to heart disease and strokes should be done every 5 years. Colorectal screening that evaluates for blood or polyps in your colon should be done yearly as a stool test or every five years as a flexible sigmoidoscope or every 10 years as a colonoscopy up to age 76. Breast cancer screening with a mammogram is recommended biennially  for women age 54-69. Screening for cervical cancer with a pap smear and pelvic exam is recommended for women after age 72 years every 2 years up to age 79 or when the provider and patient decide to stop. If there is a history of cervical abnormalities or other increased risk for cancer then the test is recommended yearly. Hepatitis C screening is also recommended for anyone born between 80 through [address]. A shingles vaccine is also recommended once in a lifetime after age 61. Your Medicare Wellness Exam is recommended annually. Here is a list of your current Health Maintenance items with a due date: 
Health Maintenance Due Topic Date Due  
 DTaP/Tdap/Td  (1 - Tdap) 05/03/1970 [address] [place] [address] Eye Exam  04/21/2018 Well Visit, Over 72: Care Instructions Your Care Instructions Physical exams can help you stay healthy. Your doctor has checked your overall health and may have suggested ways to take good care of yourself. He or she also may have recommended tests. At home, you can help prevent illness with healthy eating, regular exercise, and other steps. Follow-up care is a key part of your treatment and safety. Be sure to make and go to all appointments, and call your doctor if you are having problems. It's also a good idea to know your test results and keep a list of the medicines you take. How can you care for yourself at home? · Reach and stay at a healthy weight. This will lower your risk for many problems, such as obesity, diabetes, heart disease, and high blood pressure. · Get at least 30 minutes of exercise on most days of the week. Walking is a good choice.  You also may want to do other activities, such as running, swimming, cycling, or playing tennis or team sports. · Do not smoke. Smoking can make health problems worse. If you need help quitting, talk to your doctor about stop-smoking programs and medicines. These can increase your chances of quitting for good. · Protect your skin from too much sun. When you're outdoors from 10 a.m. to 4 p.m., stay in the shade or cover up with clothing and a hat with a wide brim. Wear sunglasses that block UV rays. Even when it's cloudy, put broad-spectrum sunscreen (SPF 30 or higher) on any exposed skin. · See a dentist one or two times a year for checkups and to have your teeth cleaned. · Wear a seat belt in the car. · Limit alcohol to 2 drinks a day for men and 1 drink a day for women. Too much alcohol can cause health problems. Follow your doctor's advice about when to have certain tests. These tests can spot problems early. For men and women · Cholesterol. Your doctor will tell you how often to have this done based on your overall health and other things that can increase your risk for heart attack and stroke. · Blood pressure. Have your blood pressure checked during a routine doctor visit. Your doctor will tell you how often to check your blood pressure based on your age, your blood pressure results, and other factors. · Diabetes. Ask your doctor whether you should have tests for diabetes. · Vision. Experts recommend that you have yearly exams for glaucoma and other age-related eye problems. · Hearing. Tell your doctor if you notice any change in your hearing. You can have tests to find out how well you hear. · Colon cancer tests. Keep having colon cancer tests as your doctor recommends. You can have one of several types of tests. · Heart attack and stroke risk. At least every 4 to 6 years, you should have your risk for heart attack and stroke assessed.  Your doctor uses factors such as your age, blood pressure, cholesterol, and whether you smoke or have diabetes to show what your risk for a heart attack or stroke is over the next 10 years. · Osteoporosis. Talk to your doctor about whether you should have a bone density test to find out whether you have thinning bones. Also ask your doctor about whether you should take calcium and vitamin D supplements. For women · Pap test and pelvic exam. You may no longer need a Pap test. Talk with your doctor about whether to stop or continue to have Pap tests. · Breast exam and mammogram. Ask how often you should have a mammogram, which is an X-ray of your breasts. A mammogram can spot breast cancer before it can be felt and when it is easiest to treat. · Thyroid disease. Talk to your doctor about whether to have your thyroid checked as part of a regular physical exam. Women have an increased chance of a thyroid problem. For men · Prostate exam. Talk to your doctor about whether you should have a blood test (called a PSA test) for prostate cancer. Experts disagree on whether men should have this test. Some experts recommend that you discuss the benefits and risks of the test with your doctor. · Abdominal aortic aneurysm. Ask your doctor whether you should have a test to check for an aneurysm. You may need a test if you ever smoked or if your parent, brother, sister, or child has had an aneurysm. When should you call for help? Watch closely for changes in your health, and be sure to contact your doctor if you have any problems or symptoms that concern you. Where can you learn more? Go to http://george-junior.info/. Enter E755 in the search box to learn more about \"Well Visit, Over 65: Care Instructions. \" Current as of: May 12, 2017 Content Version: 11.4 © 1096-6860 Healthwise, Incorporated. Care instructions adapted under license by [employer] (which disclaims liability or warranty for this information).  If you have questions about a medical condition or this instruction, always ask your healthcare professional. [address] any warranty or liability for your use of this information. Introducing [facility] SERVICES! Marciano Sampson introduces [employer] patient portal. Now you can access parts of your medical record, email your doctor's office, and request medication refills online. 1. In your internet browser, go to [email or web address] 2. Click on the First Time User? Click Here link in the Sign In box. You will see the New Member Sign Up page. 3. Enter your [employer] Access Code exactly as it appears below. You will not need to use this code after youve completed the sign-up process. If you do not sign up before the expiration date, you must request a new code. · [employer] Access Code: [facility] [place] Expires: 4/23/2018 10:03 AM 
 
4. Enter the last four digits of your Social Security Number (xxxx) and Date of Birth (mm/dd/yyyy) as indicated and click Submit. You will be taken to the next sign-up page. 5. Create a Big Screen [employer] ID. This will be your [employer] login ID and cannot be changed, so think of one that is secure and easy to remember. 6. Create a [employer] password. You can change your password at any time. 7. Enter your Password Reset Question and Answer. This can be used at a later time if you forget your password. 8. Enter your e-mail address. You will receive e-mail notification when new information is available in [address] 9. Click Sign Up. You can now view and download portions of your medical record. 10. Click the Download Summary menu link to download a portable copy of your medical information. If you have questions, please visit the Frequently Asked Questions section of the [employer] website. Remember, [employer] is NOT to be used for urgent needs. For medical emergencies, dial 911. Now available from your iPhone and Android! Please provide this summary of care documentation to your next provider. Your primary care clinician is listed as Kacy Burns. If you have any questions after today's visit, please call [phone].

## 2018-03-27 NOTE — PROGRESS NOTES
Health Maintenance Due   Topic Date Due    DTaP/Tdap/Td series (1 - Tdap) 05/03/1970    ZOSTER VACCINE AGE 60>  03/03/2009    Bone Densitometry (Dexa) Screening  05/03/2014    Influenza Age 5 to Adult  08/01/2017    MEDICARE YEARLY EXAM  03/14/2018    EYE EXAM RETINAL OR DILATED Q1  04/21/2018     Body mass index is 20.66 kg/(m^2). 1. Have you been to the ER, urgent care clinic since your last visit? Hospitalized since your last visit? No    2. Have you seen or consulted any other health care providers outside of the [address] [place] [address] since your last visit? Include any pap smears or colon screening.  No  Reviewed record in preparation for visit and have necessary documentation  Pt did not bring medication to office visit for review  Information was given to pt on Advanced Directives, Living Will  Information was given on Shingles Vaccine  opportunity was given for questions  Goals that were addressed and/or need to be completed during or after this appointment include

## 2024-11-20 NOTE — MR AVS SNAPSHOT
Assessment unchanged from yesterday. Proceed with treatment as planned.   Visit Information Date & Time Provider Department Dept. Phone Encounter #  
 2/14/2017  8:20 AM Tina Dumas MD [facility] [id number] Upcoming Health Maintenance Date Due DTaP/Tdap/Td series (1 - Tdap) 5/3/1970 ZOSTER VACCINE AGE 60> 5/3/2009 OSTEOPOROSIS SCREENING (DEXA) 5/3/2014 INFLUENZA AGE 9 TO ADULT 8/1/2016 EYE EXAM RETINAL OR DILATED Q1 9/1/2016 MEDICARE YEARLY EXAM 11/17/2016 Pneumococcal 65+ High/Highest Risk (2 of 2 - PCV13) 11/7/2017* HEMOGLOBIN A1C Q6M 5/3/2017 GLAUCOMA SCREENING Q2Y 9/1/2017 LIPID PANEL Q1 11/3/2017 FOOT EXAM Q1 11/7/2017 MICROALBUMIN Q1 11/7/2017 BREAST CANCER SCRN MAMMOGRAM 10/20/2018 *Topic was postponed. The date shown is not the original due date. Allergies as of 2/14/2017  Review Complete On: 2/14/2017 By: Leslie Malcolm LPN Severity Noted Reaction Type Reactions Peanut Medium 02/25/2015    Swelling Current Immunizations  Never Reviewed Name Date Pneumococcal Polysaccharide (PPSV-23) 4/1/2015 Not reviewed this visit You Were Diagnosed With   
  
 Codes Comments Essential hypertension    -  Primary ICD-10-CM: I10 
ICD-9-CM: 401.9 Diabetes mellitus type 2, diet-controlled ([facility].)     ICD-10-CM: E11.9 ICD-9-CM: 250.00 ESRD on dialysis [facility])     ICD-10-CM: N18.6, Z99.2 ICD-9-CM: 585.6, V45.11 [facility]     ICD-10-CM: F41.699 ICD-9-CM: V49.70 Vitals BP Pulse Temp Resp Height(growth percentile) Weight(growth percentile) 179/59 87 97.8 °F (36.6 °C) (Oral) 16 5' 6\" (1.676 m) 124 lb (56.2 kg) SpO2 BMI OB Status Smoking Status 95% 20.01 kg/m2 Hysterectomy Never Smoker Vitals History BMI and BSA Data Body Mass Index Body Surface Area 20.01 kg/m 2 1.62 m 2 Preferred Pharmacy Pharmacy Name Phone [facility], [address] [place] [address] Your Updated Medication List  
  
   
This list is accurate as of: 2/14/17  9:05 AM.  Always use your most recent med list. amLODIPine 10 mg tablet Commonly known as:  Tad Savannah Take 1 Tab by mouth daily. calcitRIOL 0.5 mcg capsule Commonly known as:  ROCALTROL Take 4 Caps by mouth daily. calcium acetate 667 mg Cap Commonly known as:  PHOSLO Take 2 Caps by mouth three (3) times daily (with meals). carvedilol 25 mg tablet Commonly known as:  [place] Pretty Take 1 Tab by mouth two (2) times daily (with meals). furosemide 40 mg tablet Commonly known as:  LASIX Take one tablet daily LIPITOR 20 mg tablet Generic drug:  atorvastatin TAKE 1 BY MOUTH DAILY  
  
 lisinopril 5 mg tablet Commonly known as:  Jesse Chris Take 1 Tab by mouth daily. OTHER(NON-FORMULARY) 1 Each by Does Not Apply route daily. Prothesis for BKA of R leg PROCRIT 10,000 unit/mL injection Generic drug:  epoetin mark  
by SubCUTAneous route once.  
  
 sodium bicarbonate 325 mg tablet Take 1 Tab by mouth four (4) times daily. We Performed the Following REFERRAL TO OPHTHALMOLOGY [REF57 Custom] Comments:  
 Patient with DM2 in need of eye exam. 
Dr. Peng Ngo Referral Information Referral ID Referred By Referred To  
  
 [id number] Deondre HUYNH Not Available Visits Status Start Date End Date 1 New Request 2/14/17 2/14/18 If your referral has a status of pending review or denied, additional information will be sent to support the outcome of this decision. Introducing [facility]! Hamzah Godoy introduces [employer] patient portal. Now you can access parts of your medical record, email your doctor's office, and request medication refills online. 1. In your internet browser, go to [email or web address] 2. Click on the First Time User? Click Here link in the Sign In box.  You will see the New Member Sign Up page. 3. Enter your [email or web address] Access Code exactly as it appears below. You will not need to use this code after youve completed the sign-up process. If you do not sign up before the expiration date, you must request a new code. · [email or web address] Access Code: [email or web address] Expires: 5/9/2017 12:00 PM 
 
4. Enter the last four digits of your Social Security Number (xxxx) and Date of Birth (mm/dd/yyyy) as indicated and click Submit. You will be taken to the next sign-up page. 5. Create a [email or web address] ID. This will be your [email or web address] login ID and cannot be changed, so think of one that is secure and easy to remember. 6. Create a [email or web address] password. You can change your password at any time. 7. Enter your Password Reset Question and Answer. This can be used at a later time if you forget your password. 8. Enter your e-mail address. You will receive e-mail notification when new information is available in [address] 9. Click Sign Up. You can now view and download portions of your medical record. 10. Click the Download Summary menu link to download a portable copy of your medical information. If you have questions, please visit the Frequently Asked Questions section of the mig33 website. Remember, mig33 is NOT to be used for urgent needs. For medical emergencies, dial 911. Now available from your iPhone and Android! Please provide this summary of care documentation to your next provider. Your primary care clinician is listed as Uriel Wagner. If you have any questions after today's visit, please call [phone].

## (undated) DEVICE — TRAP SUC MUCOUS 70ML -- MEDICHOICE MEDLINE

## (undated) DEVICE — CONTAINER SPEC 20 ML LID NEUT BUFF FORMALIN 10 % POLYPR STS

## (undated) DEVICE — 1200 GUARD II KIT W/5MM TUBE W/O VAC TUBE: Brand: GUARDIAN

## (undated) DEVICE — Device

## (undated) DEVICE — TUBING ADMIN SET INTRAV ARTERI -- CONVERT TO ITEM 340436

## (undated) DEVICE — SOLIDIFIER MEDC 1200ML -- CONVERT TO 356117

## (undated) DEVICE — CATH IV AUTOGRD BC BLU 22GA 25 -- INSYTE

## (undated) DEVICE — SNARE ENDOSCP M L240CM W27MM SHTH DIA2.4MM CHN 2.8MM OVL

## (undated) DEVICE — BAG SPEC BIOHZRD 10 X 10 IN --

## (undated) DEVICE — BAG BELONG PT PERS CLEAR HANDL

## (undated) DEVICE — KENDALL RADIOLUCENT FOAM MONITORING ELECTRODE -RECTANGULAR SHAPE: Brand: KENDALL

## (undated) DEVICE — KIT COLON W/ 1.1OZ LUB AND 2 END

## (undated) DEVICE — 3M™ CUROS™ DISINFECTING CAP FOR NEEDLELESS CONNECTORS 270/CARTON 20 CARTONS/CASE CFF1-270: Brand: CUROS™

## (undated) DEVICE — REM POLYHESIVE ADULT PATIENT RETURN ELECTRODE: Brand: VALLEYLAB

## (undated) DEVICE — CANN NASAL O2 CAPNOGRAPHY AD -- FILTERLINE